# Patient Record
Sex: MALE | Race: WHITE | NOT HISPANIC OR LATINO | Employment: OTHER | URBAN - NONMETROPOLITAN AREA
[De-identification: names, ages, dates, MRNs, and addresses within clinical notes are randomized per-mention and may not be internally consistent; named-entity substitution may affect disease eponyms.]

---

## 2022-01-25 ENCOUNTER — OFFICE VISIT (OUTPATIENT)
Dept: FAMILY MEDICINE CLINIC | Facility: CLINIC | Age: 57
End: 2022-01-25

## 2022-01-25 ENCOUNTER — LAB (OUTPATIENT)
Dept: LAB | Facility: HOSPITAL | Age: 57
End: 2022-01-25

## 2022-01-25 VITALS
HEIGHT: 72 IN | DIASTOLIC BLOOD PRESSURE: 76 MMHG | SYSTOLIC BLOOD PRESSURE: 126 MMHG | BODY MASS INDEX: 30.61 KG/M2 | WEIGHT: 226 LBS

## 2022-01-25 DIAGNOSIS — Z59.89 DOES NOT HAVE HEALTH INSURANCE: Chronic | ICD-10-CM

## 2022-01-25 DIAGNOSIS — E66.09 CLASS 1 OBESITY DUE TO EXCESS CALORIES WITHOUT SERIOUS COMORBIDITY WITH BODY MASS INDEX (BMI) OF 30.0 TO 30.9 IN ADULT: Primary | Chronic | ICD-10-CM

## 2022-01-25 DIAGNOSIS — Z85.850 HISTORY OF THYROID CANCER: Chronic | ICD-10-CM

## 2022-01-25 DIAGNOSIS — E89.0 POSTOPERATIVE HYPOTHYROIDISM: Chronic | ICD-10-CM

## 2022-01-25 DIAGNOSIS — Z87.2 HISTORY OF PSORIASIS: Chronic | ICD-10-CM

## 2022-01-25 DIAGNOSIS — Z87.898 HISTORY OF SYNCOPE: ICD-10-CM

## 2022-01-25 PROCEDURE — 84439 ASSAY OF FREE THYROXINE: CPT | Performed by: FAMILY MEDICINE

## 2022-01-25 PROCEDURE — 99202 OFFICE O/P NEW SF 15 MIN: CPT | Performed by: FAMILY MEDICINE

## 2022-01-25 PROCEDURE — 84443 ASSAY THYROID STIM HORMONE: CPT | Performed by: FAMILY MEDICINE

## 2022-01-25 PROCEDURE — 36415 COLL VENOUS BLD VENIPUNCTURE: CPT | Performed by: FAMILY MEDICINE

## 2022-01-25 RX ORDER — LEVOTHYROXINE SODIUM 0.15 MG/1
150 TABLET ORAL DAILY
COMMUNITY
End: 2022-01-25 | Stop reason: SDUPTHER

## 2022-01-25 RX ORDER — LEVOTHYROXINE SODIUM 0.15 MG/1
150 TABLET ORAL DAILY
Qty: 90 TABLET | Refills: 3 | Status: SHIPPED | OUTPATIENT
Start: 2022-01-25 | End: 2022-01-26

## 2022-01-25 SDOH — ECONOMIC STABILITY - INCOME SECURITY: OTHER PROBLEMS RELATED TO HOUSING AND ECONOMIC CIRCUMSTANCES: Z59.89

## 2022-01-25 NOTE — PROGRESS NOTES
Subjective   Harry Zambrano is a 56 y.o. male.  Requesting primary care evaluation.  [place]  Carries diagnoses of thyroid carcinoma status post total thyroidectomy.  Last T4 and TSH were done in the fall TSH was not below 1 had adjustments in dosing need to repeat.  Chemistries at that time within normal limits.  Takes no other regular medication.  Past surgical history is also remarkable for rotator cuff repair in the past.  Does not smoke nor drink.  Has had vaccinations as outlined.  Has had a colonoscopy within the last 5 to 6 years.  History and sidebar are generated.  [facility] has a loop recorder and will need to be removed soon was put in sometime ago for recurrent syncope felt to be cardiac in nature but no definitive diagnosis    History of Present Illness   HPI    The following portions of the patient's history were reviewed and updated as appropriate: allergies, current medications, past family history, past medical history, past social history, past surgical history and problem list.    Review of Systems  Review of Systems   Constitutional: Negative for activity change, appetite change, fatigue and unexpected weight change.   HENT: Negative for trouble swallowing and voice change.    Eyes: Negative for redness and visual disturbance.   Respiratory: Negative for cough and wheezing.    Cardiovascular: Negative for chest pain and palpitations.   Gastrointestinal: Negative for abdominal pain, constipation, diarrhea, nausea and vomiting.   Genitourinary: Negative for urgency.   Musculoskeletal: Negative for joint swelling.   Skin: Positive for rash (Has a history of localized psoriasis behind the right ear and umbilicus currently not active at present).   Neurological: Negative for syncope and headaches.   Hematological: Negative for adenopathy.   Psychiatric/Behavioral: Negative for sleep disturbance.       Objective   Physical Exam  Physical Exam  Constitutional:        "Appearance: He is well-developed.   HENT:      Head: Normocephalic.   Eyes:      Pupils: Pupils are equal, round, and reactive to light.   Neck:      Thyroid: No thyroid mass, thyromegaly or thyroid tenderness.   Cardiovascular:      Rate and Rhythm: Normal rate and regular rhythm.      Heart sounds: Normal heart sounds. No murmur heard.  No friction rub. No gallop.    Pulmonary:      Breath sounds: Normal breath sounds.   Abdominal:      General: There is no distension.      Palpations: Abdomen is soft. There is no mass.      Tenderness: There is no abdominal tenderness.   Musculoskeletal:         General: Normal range of motion.      Cervical back: Normal range of motion.   Lymphadenopathy:      Cervical: No cervical adenopathy.   Skin:     General: Skin is warm and dry.   Neurological:      Mental Status: He is alert and oriented to person, place, and time.      Deep Tendon Reflexes: Reflexes are normal and symmetric.   Psychiatric:         Attention and Perception: Attention normal.         Mood and Affect: Mood normal.         Speech: Speech normal.         Behavior: Behavior normal.         Thought Content: Thought content normal.         Cognition and Memory: Cognition normal.           Visit Vitals  /76   Ht 182.9 cm (72\")   Wt 103 kg (226 lb)   BMI 30.65 kg/m²     Body mass index is 30.65 kg/m².    /76   Ht 182.9 cm (72\")   Wt 103 kg (226 lb)   BMI 30.65 kg/m²     Assessment/Plan   Diagnoses and all orders for this visit:    1. Class 1 obesity due to excess calories without serious comorbidity with body mass index (BMI) of 30.0 to 30.9 in adult (Primary)    2. History of thyroid cancer  -     T4, Free  -     TSH  -     levothyroxine (SYNTHROID, LEVOTHROID) 150 MCG tablet; Take 1 tablet by mouth Daily.  Dispense: 90 tablet; Refill: 3    3. History of syncope  -     Ambulatory Referral to Cardiology    4. History of psoriasis    5. Postoperative hypothyroidism  -     T4, Free  -     TSH  -     " levothyroxine (SYNTHROID, LEVOTHROID) 150 MCG tablet; Take 1 tablet by mouth Daily.  Dispense: 90 tablet; Refill: 3    6. Does not have health insurance    Repeat free T4 and TSH today.  Adjust medicines accordingly.  Obtain old records.  Follow-up based on results.

## 2022-01-26 DIAGNOSIS — E89.0 POSTOPERATIVE HYPOTHYROIDISM: Primary | Chronic | ICD-10-CM

## 2022-01-26 LAB
T4 FREE SERPL-MCNC: 1.64 NG/DL (ref 0.93–1.7)
TSH SERPL DL<=0.05 MIU/L-ACNC: 7.22 UIU/ML (ref 0.27–4.2)

## 2022-01-26 RX ORDER — LEVOTHYROXINE SODIUM 175 UG/1
175 TABLET ORAL DAILY
Qty: 90 TABLET | Refills: 1 | Status: SHIPPED | OUTPATIENT
Start: 2022-01-26 | End: 2022-05-03 | Stop reason: SDUPTHER

## 2022-01-26 NOTE — PROGRESS NOTES
T4 ok.  Tsh at 7.  ? Been out of meds for any length of time.  If not, then will need to adjust dosing of current meds

## 2022-05-02 ENCOUNTER — LAB (OUTPATIENT)
Dept: LAB | Facility: HOSPITAL | Age: 57
End: 2022-05-02

## 2022-05-02 DIAGNOSIS — E89.0 POSTOPERATIVE HYPOTHYROIDISM: Chronic | ICD-10-CM

## 2022-05-02 LAB
T4 FREE SERPL-MCNC: 2.02 NG/DL (ref 0.93–1.7)
TSH SERPL DL<=0.05 MIU/L-ACNC: 0.18 UIU/ML (ref 0.27–4.2)

## 2022-05-02 PROCEDURE — 84443 ASSAY THYROID STIM HORMONE: CPT

## 2022-05-02 PROCEDURE — 84439 ASSAY OF FREE THYROXINE: CPT

## 2022-05-02 PROCEDURE — 36415 COLL VENOUS BLD VENIPUNCTURE: CPT

## 2022-05-03 ENCOUNTER — TELEPHONE (OUTPATIENT)
Dept: FAMILY MEDICINE CLINIC | Facility: CLINIC | Age: 57
End: 2022-05-03

## 2022-05-03 DIAGNOSIS — E89.0 POSTOPERATIVE HYPOTHYROIDISM: Primary | Chronic | ICD-10-CM

## 2022-05-03 RX ORDER — LEVOTHYROXINE SODIUM 0.15 MG/1
TABLET ORAL
Qty: 90 TABLET | Refills: 1 | Status: SHIPPED | OUTPATIENT
Start: 2022-05-03 | End: 2022-09-09 | Stop reason: SDUPTHER

## 2022-05-03 NOTE — PROGRESS NOTES
Thyroid level too high.  Will step down dose to next lower level and hold there. Be at pharmacy today

## 2022-09-08 ENCOUNTER — OFFICE VISIT (OUTPATIENT)
Dept: FAMILY MEDICINE CLINIC | Facility: CLINIC | Age: 57
End: 2022-09-08

## 2022-09-08 ENCOUNTER — LAB (OUTPATIENT)
Dept: LAB | Facility: HOSPITAL | Age: 57
End: 2022-09-08

## 2022-09-08 VITALS
BODY MASS INDEX: 30.53 KG/M2 | WEIGHT: 225.4 LBS | HEIGHT: 72 IN | DIASTOLIC BLOOD PRESSURE: 80 MMHG | SYSTOLIC BLOOD PRESSURE: 122 MMHG

## 2022-09-08 DIAGNOSIS — Z13.220 SCREENING CHOLESTEROL LEVEL: ICD-10-CM

## 2022-09-08 DIAGNOSIS — E89.0 POSTOPERATIVE HYPOTHYROIDISM: Primary | Chronic | ICD-10-CM

## 2022-09-08 DIAGNOSIS — Z12.5 SCREENING PSA (PROSTATE SPECIFIC ANTIGEN): ICD-10-CM

## 2022-09-08 DIAGNOSIS — Z85.850 HISTORY OF THYROID CANCER: Chronic | ICD-10-CM

## 2022-09-08 DIAGNOSIS — M26.622 TMJ TENDERNESS, LEFT: ICD-10-CM

## 2022-09-08 DIAGNOSIS — E66.09 CLASS 1 OBESITY DUE TO EXCESS CALORIES WITHOUT SERIOUS COMORBIDITY WITH BODY MASS INDEX (BMI) OF 31.0 TO 31.9 IN ADULT: Chronic | ICD-10-CM

## 2022-09-08 DIAGNOSIS — L82.1 SEBORRHEIC KERATOSES: ICD-10-CM

## 2022-09-08 DIAGNOSIS — Z11.59 NEED FOR HEPATITIS C SCREENING TEST: ICD-10-CM

## 2022-09-08 DIAGNOSIS — Z87.898 HISTORY OF SYNCOPE: Chronic | ICD-10-CM

## 2022-09-08 PROBLEM — Z59.89 DOES NOT HAVE HEALTH INSURANCE: Chronic | Status: RESOLVED | Noted: 2022-01-25 | Resolved: 2022-09-08

## 2022-09-08 PROCEDURE — 86803 HEPATITIS C AB TEST: CPT | Performed by: FAMILY MEDICINE

## 2022-09-08 PROCEDURE — 36415 COLL VENOUS BLD VENIPUNCTURE: CPT | Performed by: FAMILY MEDICINE

## 2022-09-08 PROCEDURE — 84443 ASSAY THYROID STIM HORMONE: CPT | Performed by: FAMILY MEDICINE

## 2022-09-08 PROCEDURE — 84439 ASSAY OF FREE THYROXINE: CPT | Performed by: FAMILY MEDICINE

## 2022-09-08 PROCEDURE — 99214 OFFICE O/P EST MOD 30 MIN: CPT | Performed by: FAMILY MEDICINE

## 2022-09-08 PROCEDURE — G0103 PSA SCREENING: HCPCS | Performed by: FAMILY MEDICINE

## 2022-09-08 PROCEDURE — 80061 LIPID PANEL: CPT | Performed by: FAMILY MEDICINE

## 2022-09-08 RX ORDER — OMEPRAZOLE 20 MG/1
20 TABLET, DELAYED RELEASE ORAL
COMMUNITY

## 2022-09-08 NOTE — PROGRESS NOTES
Subjective   Harry Zambrano is a 57 y.o. male.  Reevaluation previous visit.  Now has health insurance.  Did not keep follow-up with cardiology in regards to syncope work-up from elsewhere we referred to electrophysiologist patient request.  Counseled probably to get all his records.  Some already in epic.  It is time for lab work.  Time to check thyroid etc.  In interim has had some seborrheic keratoses of the forehead he is counseled about.  Also had otalgia on the left treated elsewhere for was considered otitis however exam today reveals probably more TMJ.  History of Present Illness   HPI    The following portions of the patient's history were reviewed and updated as appropriate: allergies, current medications, past family history, past medical history, past social history, past surgical history and problem list.    Review of Systems  Review of Systems   Constitutional: Negative for activity change, appetite change, fatigue and unexpected weight change.   HENT: Positive for ear pain (Intermittent left). Negative for trouble swallowing and voice change.    Eyes: Negative for redness and visual disturbance.   Respiratory: Negative for cough and wheezing.    Cardiovascular: Negative for chest pain and palpitations.   Gastrointestinal: Negative for abdominal pain, constipation, diarrhea, nausea and vomiting.   Genitourinary: Negative for urgency.   Musculoskeletal: Negative for joint swelling.   Neurological: Negative for syncope and headaches.   Hematological: Negative for adenopathy.   Psychiatric/Behavioral: Negative for sleep disturbance.       Objective   Physical Exam  Physical Exam  Constitutional:       Appearance: He is well-developed.   HENT:      Head: Normocephalic.      Right Ear: Tympanic membrane, ear canal and external ear normal.      Left Ear: Tympanic membrane, ear canal and external ear normal.      Ears:      Comments: Left TMJ mL nontender full range of motion     Nose: Nose normal.   Eyes:     "  Pupils: Pupils are equal, round, and reactive to light.   Neck:      Thyroid: No thyromegaly.   Cardiovascular:      Rate and Rhythm: Normal rate and regular rhythm.      Heart sounds: Normal heart sounds. No murmur heard.    No friction rub. No gallop.   Pulmonary:      Breath sounds: Normal breath sounds.   Abdominal:      General: There is no distension.      Palpations: Abdomen is soft. There is no mass.      Tenderness: There is no abdominal tenderness.   Musculoskeletal:         General: Normal range of motion.      Cervical back: Normal range of motion.   Skin:     General: Skin is warm and dry.   Neurological:      Mental Status: He is alert and oriented to person, place, and time.      Deep Tendon Reflexes: Reflexes are normal and symmetric.           Visit Vitals  /80   Ht 181.6 cm (71.5\")   Wt 102 kg (225 lb 6.4 oz)   BMI 31.00 kg/m²     Body mass index is 31 kg/m².    /80   Ht 181.6 cm (71.5\")   Wt 102 kg (225 lb 6.4 oz)   BMI 31.00 kg/m²     Assessment/Plan   Diagnoses and all orders for this visit:    1. Postoperative hypothyroidism (Primary)  -     T4, Free  -     TSH    2. Class 1 obesity due to excess calories without serious comorbidity with body mass index (BMI) of 31.0 to 31.9 in adult    3. History of thyroid cancer    4. Screening cholesterol level  -     Lipid Panel With LDL / HDL Ratio    5. Screening PSA (prostate specific antigen)  -     PSA Screen    6. Need for hepatitis C screening test  -     Hepatitis C Antibody    7. History of syncope  -     Ambulatory Referral to Cardiology    8. Seborrheic keratoses    9. TMJ tenderness, left     on wt loss measures and programs  Dental referral for TMJ.  Lab work ordered today.  Referred back to cardiology.  Will get a shingles vaccine on a weekend when he can rest.  Otherwise recheck 6 months  "

## 2022-09-09 DIAGNOSIS — E89.0 POSTOPERATIVE HYPOTHYROIDISM: Chronic | ICD-10-CM

## 2022-09-09 LAB
CHOLEST SERPL-MCNC: 191 MG/DL (ref 0–200)
HCV AB SER DONR QL: NORMAL
HDLC SERPL-MCNC: 47 MG/DL (ref 40–60)
LDLC SERPL CALC-MCNC: 125 MG/DL (ref 0–100)
LDLC/HDLC SERPL: 2.61 {RATIO}
PSA SERPL-MCNC: 0.52 NG/ML (ref 0–4)
T4 FREE SERPL-MCNC: 1.79 NG/DL (ref 0.93–1.7)
TRIGL SERPL-MCNC: 107 MG/DL (ref 0–150)
TSH SERPL DL<=0.05 MIU/L-ACNC: 0.49 UIU/ML (ref 0.27–4.2)
VLDLC SERPL-MCNC: 19 MG/DL (ref 5–40)

## 2022-09-09 RX ORDER — LEVOTHYROXINE SODIUM 0.15 MG/1
TABLET ORAL
Qty: 90 TABLET | Refills: 3 | Status: SHIPPED | OUTPATIENT
Start: 2022-09-09

## 2022-10-25 ENCOUNTER — OFFICE VISIT (OUTPATIENT)
Dept: CARDIOLOGY | Facility: CLINIC | Age: 57
End: 2022-10-25

## 2022-10-25 ENCOUNTER — CLINICAL SUPPORT (OUTPATIENT)
Dept: CARDIOLOGY | Facility: CLINIC | Age: 57
End: 2022-10-25

## 2022-10-25 VITALS
SYSTOLIC BLOOD PRESSURE: 132 MMHG | OXYGEN SATURATION: 97 % | BODY MASS INDEX: 30.61 KG/M2 | DIASTOLIC BLOOD PRESSURE: 80 MMHG | HEART RATE: 76 BPM | WEIGHT: 226 LBS | HEIGHT: 72 IN

## 2022-10-25 DIAGNOSIS — R55 VASOVAGAL SYNCOPE: Primary | ICD-10-CM

## 2022-10-25 DIAGNOSIS — Z87.898 HISTORY OF SYNCOPE: Primary | Chronic | ICD-10-CM

## 2022-10-25 LAB
QT INTERVAL: 386 MS
QTC INTERVAL: 434 MS

## 2022-10-25 PROCEDURE — 99203 OFFICE O/P NEW LOW 30 MIN: CPT | Performed by: INTERNAL MEDICINE

## 2022-10-25 PROCEDURE — 93000 ELECTROCARDIOGRAM COMPLETE: CPT | Performed by: INTERNAL MEDICINE

## 2022-10-25 PROCEDURE — 93291 INTERROG DEV EVAL SCRMS IP: CPT | Performed by: INTERNAL MEDICINE

## 2022-10-25 NOTE — PROGRESS NOTES
Harry Zambrano  57 y.o. male    10/25/2022  1. Vasovagal syncope        History of Present Illness:    Body mass index is 31.08 kg/m². BMI is above normal parameters. Recommendations include: exercise counseling, nutrition counseling and referral to primary care.    57 years old patient who relocated to our facility with clinical diagnosis of what appears to vasovagal syncope have total 2 episode and about 4 years ago when evaluated at [facility] and having extensive evaluations including echo and stress test and also neurological evaluations.  Subsequent intracardiac loop was implanted and diagnosis of vasovagal or neurocardiogenic syncope was made.  The loop was implanted more than 4 years since that there is no further recurrence of syncopal episode or dizziness.  He was being managed with salt and water and AWA stockings.  He want to establish with our office.  He remained physically active fortunately does not drink no symptom of cardiac decompensation such orthopnea PND chest pain or any further syncope episode reported he does have history of postoperative hypothyroidism and history of cirrhosis and previous history of thyroid cancer.  He is a class I obesity with a BMI of 31.08.  EKG in the office sinus rhythm with rare PACs.  Monitor was reviewed from medical record revealed sinus rhythm with PVCs          SUBJECTIVE:    No Known Allergies      History reviewed. No pertinent past medical history.      Past Surgical History:   Procedure Laterality Date   • ROTATOR CUFF REPAIR     • TOTAL THYROIDECTOMY           Family History   Problem Relation Age of Onset   • Diabetes Mother    • Hemochromatosis Maternal Grandfather          Social History     Socioeconomic History   • Marital status:    Tobacco Use   • Smoking status: Never   • Smokeless tobacco: Never   Substance and Sexual Activity   • Alcohol use: Never   • Drug use: Never         Current Outpatient Medications   Medication Sig  "Dispense Refill   • levothyroxine (Synthroid) 150 MCG tablet 1 qd new dose 90 tablet 3   • omeprazole OTC (PriLOSEC OTC) 20 MG EC tablet Take 20 mg by mouth.       No current facility-administered medications for this visit.           Review of Systems:         Constitutional:  Denies recent weight loss, weight gain,no change in exercise tolerance.     HENT:  Denies any hearing loss, epistaxis    Eyes: No blurring    Respiratory: No history of COPD    Cardiovascular: See H&P    Gastrointestinal:  Denies change in bowel habits and dyspepsia    Endocrine: Negative for cold intolerance, heat intolerance, polydipsia    Genitourinary: Negative.      Musculoskeletal: History of osteoarthritis    Skin:  Deniesrashes, or skin lesions.     Allergic/Immunologic: Negative.  Negative for environmental allergies    Neurological: Positive for syncope    Hematological: Denies any food allergies, seasonal allergies    Psychiatric/Behavioral: Denies any history of depression        OBJECTIVE:    /80 (BP Location: Right arm, Patient Position: Sitting, Cuff Size: Adult)   Pulse 76   Ht 181.6 cm (71.5\")   Wt 103 kg (226 lb)   SpO2 97%   BMI 31.08 kg/m²     Physical Exam:     No changes noted in today's physical exam    Constitutional: Cooperative, alert and oriented, well-developed, well-nourished, in no acute distress.     HENT:   Head: Normocephalic, conjunctive is a pink, thyroid is nonpalpable no carotid bruit and trachea central.     Cardiovascular: Regular rhythm, S1 and S2 normal, no S3 or S4. Apical impulse not displaced. No murmurs    Pulmonary/Chest: Chest: No chest wall tenderness no rales and wheezing    Abdominal: Abdomen soft, bowel sounds normoactive, no masses,    Musculoskeletal: No deformities, clubbing, cyanosis, erythema. positive mild edema  Neurological: No gross motor or sensory deficits noted    Skin: Warm and dry to the touch, no apparent skin lesions .     Psychiatric: He has a normal mood and " affect. His behavior is normal        Procedures      Lab Results   Component Value Date    WBC 5.4 11/14/2019    HGB 17.4 11/14/2019    HCT 49.3 11/14/2019    MCV 90 11/14/2019     11/14/2019     Lab Results   Component Value Date    BUN 16 06/12/2019    CREATININE 1.04 06/12/2019    EGFRIFNONA >60 06/12/2019    EGFRIFAFRI >60 06/12/2019    BCR 15.4 06/12/2019    CO2 30 06/12/2019    CALCIUM 9.3 06/12/2019    ALBUMIN 4.2 06/12/2019    AST 25 06/12/2019    ALT 45 06/12/2019     Lab Results   Component Value Date    CHOL 191 09/08/2022     Lab Results   Component Value Date    TRIG 107 09/08/2022     Lab Results   Component Value Date    HDL 47 09/08/2022     No components found for: LDLCALC  Lab Results   Component Value Date     (H) 09/08/2022     No results found for: HDLLDLRATIO  No components found for: CHOLHDL  No results found for: HGBA1C  Lab Results   Component Value Date    TSH 0.487 09/08/2022           ASSESSMENT AND PLAN:  #1 vasovagal syncope    Patient extensive valuation including neuro and cardiac.  Cardiac evaluation was performed at [facility] more than 4 years ago with echo stress test reported normal function with evidence of ischemia and had intracardiac loop placed manufactures ResQU.  This loop was implanted close to 4 years ago and since then per patient no further recurrence of syncope or postural dizziness.    Will continue to monitor and interrogate intracardiac loop.  Patient was counseled and educated regarding risk factor lifestyle modification.  Patient was encouraged to continue to drink water with salt palatable to taste and continue use AWA stockings.    See him in 4-month and we will make arrangements to interrogate the intracardiac loop    I spent 25 minutes caring for Harry on this date of service. This time includes time spent by me of counseling/coordination of care as relates to the presenting problem and any ordered procedures/tests as  outlined above.         This document has been electronically signed by Loren Daley MD on October 25, 2022 09:37 CDT          Diagnoses and all orders for this visit:    1. Vasovagal syncope (Primary)  -     ECG 12 Lead          Loren Daley MD  10/25/2022  09:25 CDT

## 2022-10-25 NOTE — PROGRESS NOTES
Loop Recorder 30 day Event Summary, OFFICE   Patient was evaluated in the office today and once establishes cardiology care.  Had history of vasovagal syncope s/p intracardiac loop implanted.  Indications:   Syncope     Battery status: good        Loop recorder interrogation shows 0 episodes of atrial fibrillation     COUNTER for last 30 days  Symptoms: 0  Tachy: 0  Pause: 0  [place]: 88  AT: 0  AF: 0  % of time in AF: <0.1     Observation Summary:  Noise ( % of day) 0  Loop was interrogated no significant bradycardia noted.  Underlying rhythm is sinus with PVCs

## 2022-10-28 ENCOUNTER — PATIENT ROUNDING (BHMG ONLY) (OUTPATIENT)
Dept: CARDIOLOGY | Facility: CLINIC | Age: 57
End: 2022-10-28

## 2022-10-28 NOTE — PROGRESS NOTES
October 28, 2022    Hello, may I speak with Harry Zambrano?    My name is   SINA Jiang    I am  with [facility]ARDIOLOG[facility]  [address] [place] [address] [phone].    Before we get started may I verify your date of birth? 1965    I am calling to officially welcome you to our practice and ask about your recent visit. Is this a good time to talk? yes    Tell me about your visit with us. What things went well?  everything went fine        We're always looking for ways to make our patients' experiences even better. Do you have recommendations on ways we may improve?  no    Overall were you satisfied with your first visit to our practice? yes       I appreciate you taking the time to speak with me today. Is there anything else I can do for you? no      Thank you, and have a great day.

## 2023-01-04 ENCOUNTER — CLINICAL SUPPORT (OUTPATIENT)
Dept: CARDIOLOGY | Facility: CLINIC | Age: 58
End: 2023-01-04
Payer: COMMERCIAL

## 2023-01-04 DIAGNOSIS — Z87.898 HISTORY OF SYNCOPE: Primary | Chronic | ICD-10-CM

## 2023-01-04 PROCEDURE — 93291 INTERROG DEV EVAL SCRMS IP: CPT | Performed by: INTERNAL MEDICINE

## 2023-01-04 NOTE — PROGRESS NOTES
Loop Recorder 30 day Event Summary, OFFICE   Patient was evaluated in the office today and once establishes cardiology care.  Had history of vasovagal syncope s/p intracardiac loop implanted.  Indications:   Syncope     Battery status: good        Loop recorder interrogation shows 0 episodes of atrial fibrillation     COUNTER for last 30 days  Symptoms: 0  Tachy: 0  Pause: 0  [place]: 4  AT: 0  AF: 0  % of time in AF: 0.0%     Observation Summary:  Noise ( % of day) 0    Diagnosis  Syncope  Loop was interrogated no bradycardia or tachyarrhythmia noted

## 2023-03-08 ENCOUNTER — LAB (OUTPATIENT)
Dept: LAB | Facility: HOSPITAL | Age: 58
End: 2023-03-08
Payer: COMMERCIAL

## 2023-03-08 ENCOUNTER — OFFICE VISIT (OUTPATIENT)
Dept: FAMILY MEDICINE CLINIC | Facility: CLINIC | Age: 58
End: 2023-03-08
Payer: COMMERCIAL

## 2023-03-08 VITALS
DIASTOLIC BLOOD PRESSURE: 80 MMHG | SYSTOLIC BLOOD PRESSURE: 124 MMHG | WEIGHT: 223.5 LBS | HEIGHT: 72 IN | BODY MASS INDEX: 30.27 KG/M2

## 2023-03-08 DIAGNOSIS — Z11.4 SCREENING FOR HIV (HUMAN IMMUNODEFICIENCY VIRUS): ICD-10-CM

## 2023-03-08 DIAGNOSIS — Z11.3 ROUTINE SCREENING FOR STI (SEXUALLY TRANSMITTED INFECTION): ICD-10-CM

## 2023-03-08 DIAGNOSIS — Z12.5 SCREENING PSA (PROSTATE SPECIFIC ANTIGEN): ICD-10-CM

## 2023-03-08 DIAGNOSIS — Z13.220 SCREENING CHOLESTEROL LEVEL: ICD-10-CM

## 2023-03-08 DIAGNOSIS — Z85.850 HISTORY OF THYROID CANCER: ICD-10-CM

## 2023-03-08 DIAGNOSIS — Z53.20 ANTENATAL SCREENING FOR HEPATITIS B VIRUS DECLINED: ICD-10-CM

## 2023-03-08 DIAGNOSIS — E89.0 POSTOPERATIVE HYPOTHYROIDISM: Primary | Chronic | ICD-10-CM

## 2023-03-08 DIAGNOSIS — Z02.1 PHYSICAL EXAM, PRE-EMPLOYMENT: ICD-10-CM

## 2023-03-08 DIAGNOSIS — E66.09 CLASS 1 OBESITY DUE TO EXCESS CALORIES WITHOUT SERIOUS COMORBIDITY WITH BODY MASS INDEX (BMI) OF 30.0 TO 30.9 IN ADULT: Chronic | ICD-10-CM

## 2023-03-08 PROBLEM — Z87.898 HISTORY OF SYNCOPE: Chronic | Status: RESOLVED | Noted: 2022-01-25 | Resolved: 2023-03-08

## 2023-03-08 LAB
ALBUMIN SERPL-MCNC: 4.7 G/DL (ref 3.5–5.2)
ALBUMIN/GLOB SERPL: 1.6 G/DL
ALP SERPL-CCNC: 68 U/L (ref 39–117)
ALT SERPL W P-5'-P-CCNC: 63 U/L (ref 1–41)
ANION GAP SERPL CALCULATED.3IONS-SCNC: 8.4 MMOL/L (ref 5–15)
AST SERPL-CCNC: 34 U/L (ref 1–40)
BACTERIA UR QL AUTO: ABNORMAL /HPF
BILIRUB SERPL-MCNC: 1 MG/DL (ref 0–1.2)
BILIRUB UR QL STRIP: NEGATIVE
BUN SERPL-MCNC: 15 MG/DL (ref 6–20)
BUN/CREAT SERPL: 13.2 (ref 7–25)
CALCIUM SPEC-SCNC: 10.2 MG/DL (ref 8.6–10.5)
CHLORIDE SERPL-SCNC: 101 MMOL/L (ref 98–107)
CLARITY UR: CLEAR
CO2 SERPL-SCNC: 27.6 MMOL/L (ref 22–29)
COLOR UR: ABNORMAL
CREAT SERPL-MCNC: 1.14 MG/DL (ref 0.76–1.27)
DEPRECATED RDW RBC AUTO: 41.2 FL (ref 37–54)
EGFRCR SERPLBLD CKD-EPI 2021: 76 ML/MIN/1.73
ERYTHROCYTE [DISTWIDTH] IN BLOOD BY AUTOMATED COUNT: 12.8 % (ref 12.3–15.4)
GLOBULIN UR ELPH-MCNC: 3 GM/DL
GLUCOSE SERPL-MCNC: 219 MG/DL (ref 65–99)
GLUCOSE UR STRIP-MCNC: NEGATIVE MG/DL
HBV SURFACE AG SERPL QL IA: NORMAL
HCT VFR BLD AUTO: 51.4 % (ref 37.5–51)
HGB BLD-MCNC: 18.2 G/DL (ref 13–17.7)
HGB UR QL STRIP.AUTO: NEGATIVE
HIV1+2 AB SER QL: NORMAL
HYALINE CASTS UR QL AUTO: ABNORMAL /LPF
KETONES UR QL STRIP: ABNORMAL
LEUKOCYTE ESTERASE UR QL STRIP.AUTO: NEGATIVE
MCH RBC QN AUTO: 31.7 PG (ref 26.6–33)
MCHC RBC AUTO-ENTMCNC: 35.4 G/DL (ref 31.5–35.7)
MCV RBC AUTO: 89.5 FL (ref 79–97)
NITRITE UR QL STRIP: NEGATIVE
PH UR STRIP.AUTO: <=5 [PH] (ref 5–9)
PLATELET # BLD AUTO: 191 10*3/MM3 (ref 140–450)
PMV BLD AUTO: 11.4 FL (ref 6–12)
POTASSIUM SERPL-SCNC: 4.2 MMOL/L (ref 3.5–5.2)
PROT SERPL-MCNC: 7.7 G/DL (ref 6–8.5)
PROT UR QL STRIP: ABNORMAL
RBC # BLD AUTO: 5.74 10*6/MM3 (ref 4.14–5.8)
RBC # UR STRIP: ABNORMAL /HPF
REF LAB TEST METHOD: ABNORMAL
RPR SER QL: NORMAL
SODIUM SERPL-SCNC: 137 MMOL/L (ref 136–145)
SP GR UR STRIP: 1.02 (ref 1–1.03)
SQUAMOUS #/AREA URNS HPF: ABNORMAL /HPF
T4 FREE SERPL-MCNC: 2.21 NG/DL (ref 0.93–1.7)
TSH SERPL DL<=0.05 MIU/L-ACNC: 0.41 UIU/ML (ref 0.27–4.2)
UROBILINOGEN UR QL STRIP: ABNORMAL
WBC # UR STRIP: ABNORMAL /HPF
WBC NRBC COR # BLD: 6.14 10*3/MM3 (ref 3.4–10.8)

## 2023-03-08 PROCEDURE — 80053 COMPREHEN METABOLIC PANEL: CPT | Performed by: FAMILY MEDICINE

## 2023-03-08 PROCEDURE — PEPHY: Performed by: FAMILY MEDICINE

## 2023-03-08 PROCEDURE — 99213 OFFICE O/P EST LOW 20 MIN: CPT | Performed by: FAMILY MEDICINE

## 2023-03-08 PROCEDURE — 36415 COLL VENOUS BLD VENIPUNCTURE: CPT | Performed by: FAMILY MEDICINE

## 2023-03-08 PROCEDURE — 86592 SYPHILIS TEST NON-TREP QUAL: CPT | Performed by: FAMILY MEDICINE

## 2023-03-08 PROCEDURE — 86800 THYROGLOBULIN ANTIBODY: CPT | Performed by: FAMILY MEDICINE

## 2023-03-08 PROCEDURE — 85027 COMPLETE CBC AUTOMATED: CPT | Performed by: FAMILY MEDICINE

## 2023-03-08 PROCEDURE — G0432 EIA HIV-1/HIV-2 SCREEN: HCPCS | Performed by: FAMILY MEDICINE

## 2023-03-08 PROCEDURE — 84443 ASSAY THYROID STIM HORMONE: CPT | Performed by: FAMILY MEDICINE

## 2023-03-08 PROCEDURE — 84432 ASSAY OF THYROGLOBULIN: CPT | Performed by: FAMILY MEDICINE

## 2023-03-08 PROCEDURE — 81001 URINALYSIS AUTO W/SCOPE: CPT | Performed by: FAMILY MEDICINE

## 2023-03-08 PROCEDURE — 87340 HEPATITIS B SURFACE AG IA: CPT | Performed by: FAMILY MEDICINE

## 2023-03-08 PROCEDURE — 84439 ASSAY OF FREE THYROXINE: CPT | Performed by: FAMILY MEDICINE

## 2023-03-08 NOTE — PROGRESS NOTES
Subjective   Harry Zambrano is a 57 y.o. male.  Presents today with forms from [facility] [employer] for reemployment.  Rather lengthy form to fill out with a great many lab request EKG.  This form was filled out as best can chart reviewed orders put in.  History of Present Illness   HPI    The following portions of the patient's history were reviewed and updated as appropriate: allergies, current medications, past family history, past medical history, past social history, past surgical history and problem list.    Review of Systems  Review of Systems   Constitutional: Negative for activity change, appetite change, fatigue and unexpected weight change.   HENT: Negative for trouble swallowing and voice change.    Eyes: Negative for redness and visual disturbance.   Respiratory: Negative for cough and wheezing.    Cardiovascular: Negative for chest pain and palpitations.   Gastrointestinal: Negative for abdominal pain, constipation, diarrhea, nausea and vomiting.   Genitourinary: Negative for urgency.   Musculoskeletal: Negative for joint swelling.   Neurological: Negative for syncope and headaches.   Hematological: Negative for adenopathy.   Psychiatric/Behavioral: Negative for sleep disturbance.       Objective   Physical Exam  Physical Exam  Constitutional:       Appearance: He is well-developed.   HENT:      Head: Normocephalic.   Eyes:      Pupils: Pupils are equal, round, and reactive to light.   Neck:      Thyroid: No thyromegaly.   Cardiovascular:      Rate and Rhythm: Normal rate and regular rhythm.      Heart sounds: Normal heart sounds. No murmur heard.    No friction rub. No gallop.   Pulmonary:      Breath sounds: Normal breath sounds.   Abdominal:      General: There is no distension.      Palpations: Abdomen is soft. There is no mass.      Tenderness: There is no abdominal tenderness.   Musculoskeletal:         General: Normal range of motion.      Cervical back: Normal range of motion.   Skin:     General:  "Skin is warm and dry.   Neurological:      Mental Status: He is alert and oriented to person, place, and time.      Deep Tendon Reflexes: Reflexes are normal and symmetric.           Visit Vitals  /80   Ht 181.6 cm (71.5\")   Wt 101 kg (223 lb 8 oz)   BMI 30.74 kg/m²     Body mass index is 30.74 kg/m².  /80   Ht 181.6 cm (71.5\")   Wt 101 kg (223 lb 8 oz)   BMI 30.74 kg/m²       Assessment/Plan   Diagnoses and all orders for this visit:    1. Postoperative hypothyroidism (Primary)  -     T4, Free  -     TSH    2. History of thyroid cancer  -     T4, Free  -     TSH  -     Thyroglobulin With Anti-TG  -     T4, Free; Future  -     TSH; Future  -     Thyroglobulin Antibody and Thyroglobulin, CLARIBEL or LC/MS-MS; Future    3. Screening PSA (prostate specific antigen)  -     PSA Screen; Future    4. Screening cholesterol level  -     Lipid Panel With LDL / HDL Ratio; Future    5. Class 1 obesity due to excess calories without serious comorbidity with body mass index (BMI) of 30.0 to 30.9 in adult    6. Physical exam, pre-employment  -     RPR  -     Hepatitis B surface antigen  -     HIV-1 & HIV-2 Antibodies  -     QuantiFERON TB Gold  -     Urinalysis With Microscopic - Urine, Clean Catch  -     CBC (No Diff)  -     Comprehensive Metabolic Panel    7. Screening for HIV (human immunodeficiency virus)  -     HIV-1 & HIV-2 Antibodies    8.  screening for hepatitis B virus declined  -     Hepatitis B surface antigen    9. Routine screening for STI (sexually transmitted infection)  -     RPR    Forms filled out await all lab work.  Patient is felt fit for employment.  "

## 2023-03-08 NOTE — PROGRESS NOTES
Subjective   Harry Zambrano is a 57 y.o. male.  Reevaluation history of hypothyroidism neuroid carcinoma removal obesity history of psoriasis now quiet.  In the interim is maintain weight and blood pressure maintaining all medication without problem.  It is time for all lab work related to thyroid.  Is up-to-date on all other.  Will think about getting shingles vaccine later in the year    History of Present Illness   HPI    The following portions of the patient's history were reviewed and updated as appropriate: allergies, current medications, past family history, past medical history, past social history, past surgical history and problem list.    Review of Systems  Review of Systems   Constitutional: Negative for activity change, appetite change, fatigue and unexpected weight change.   HENT: Negative for trouble swallowing and voice change.    Eyes: Negative for redness and visual disturbance.   Respiratory: Negative for cough and wheezing.    Cardiovascular: Negative for chest pain and palpitations.   Gastrointestinal: Negative for abdominal pain, constipation, diarrhea, nausea and vomiting.   Genitourinary: Negative for urgency.   Musculoskeletal: Negative for joint swelling.   Neurological: Negative for syncope and headaches.   Hematological: Negative for adenopathy.   Psychiatric/Behavioral: Negative for sleep disturbance.       Objective   Physical Exam  Physical Exam  Constitutional:       Appearance: He is well-developed.   HENT:      Head: Normocephalic.   Eyes:      Pupils: Pupils are equal, round, and reactive to light.   Neck:      Thyroid: No thyromegaly.      Comments: No mass effect  Cardiovascular:      Rate and Rhythm: Normal rate and regular rhythm.      Heart sounds: Normal heart sounds. No murmur heard.    No friction rub. No gallop.   Pulmonary:      Breath sounds: Normal breath sounds.   Abdominal:      General: There is no distension.      Palpations: Abdomen is soft. There is no mass.       "Tenderness: There is no abdominal tenderness.   Musculoskeletal:         General: Normal range of motion.      Cervical back: Normal range of motion.   Skin:     General: Skin is warm and dry.   Neurological:      Mental Status: He is alert and oriented to person, place, and time.      Deep Tendon Reflexes: Reflexes are normal and symmetric.   Psychiatric:         Attention and Perception: Attention normal.         Mood and Affect: Mood normal.         Speech: Speech normal.         Behavior: Behavior normal.         Thought Content: Thought content normal.         Cognition and Memory: Cognition normal.           Visit Vitals  /80   Ht 181.6 cm (71.5\")   Wt 101 kg (223 lb 8 oz)   BMI 30.74 kg/m²     Body mass index is 30.74 kg/m².    /80   Ht 181.6 cm (71.5\")   Wt 101 kg (223 lb 8 oz)   BMI 30.74 kg/m²   Assessment/Plan   Diagnoses and all orders for this visit:    1. Postoperative hypothyroidism (Primary)  -     T4, Free  -     TSH    2. History of thyroid cancer  -     T4, Free  -     TSH  -     Thyroglobulin With Anti-TG  -     T4, Free; Future  -     TSH; Future  -     Thyroglobulin Antibody and Thyroglobulin, CLARIBEL or LC/MS-MS; Future    3. Screening PSA (prostate specific antigen)  -     PSA Screen; Future    4. Screening cholesterol level  -     Lipid Panel With LDL / HDL Ratio; Future    5. Class 1 obesity due to excess calories without serious comorbidity with body mass index (BMI) of 30.0 to 30.9 in adult    6. Physical exam, pre-employment  -     RPR  -     Hepatitis B surface antigen  -     HIV-1 & HIV-2 Antibodies  -     QuantiFERON TB Gold  -     Urinalysis With Microscopic - Urine, Clean Catch  -     CBC (No Diff)  -     Comprehensive Metabolic Panel    7. Screening for HIV (human immunodeficiency virus)  -     HIV-1 & HIV-2 Antibodies    8.  screening for hepatitis B virus declined  -     Hepatitis B surface antigen    9. Routine screening for STI (sexually transmitted " infection)  -     RPR    Counseled on lifestyle measures counseled on diet and exercise.  Recheck again in 6 months lab prior lab today as well

## 2023-03-09 LAB
THYROGLOB AB SERPL-ACNC: <1 IU/ML (ref 0–0.9)
THYROGLOB SERPL-MCNC: <0.1 NG/ML (ref 1.4–29.2)

## 2023-03-09 NOTE — PROGRESS NOTES
Lab work done reveals thyroid level is too high again.  Also nonfasting blood sugar is 219.  This very well may reflect new onset type 2 diabetes.  We will need to do an A1c in the office and readjust thyroid.  See us sometime within the next week or so to do same.  Also lab work for preemployment is ready we will print off at that visit.

## 2023-03-15 ENCOUNTER — OFFICE VISIT (OUTPATIENT)
Dept: FAMILY MEDICINE CLINIC | Facility: CLINIC | Age: 58
End: 2023-03-15
Payer: COMMERCIAL

## 2023-03-15 VITALS
WEIGHT: 223 LBS | BODY MASS INDEX: 30.2 KG/M2 | HEIGHT: 72 IN | SYSTOLIC BLOOD PRESSURE: 122 MMHG | DIASTOLIC BLOOD PRESSURE: 78 MMHG

## 2023-03-15 DIAGNOSIS — E11.9 TYPE 2 DIABETES MELLITUS WITHOUT COMPLICATION, WITHOUT LONG-TERM CURRENT USE OF INSULIN: Primary | ICD-10-CM

## 2023-03-15 DIAGNOSIS — E11.65 TYPE 2 DIABETES MELLITUS WITH HYPERGLYCEMIA, WITHOUT LONG-TERM CURRENT USE OF INSULIN: ICD-10-CM

## 2023-03-15 PROCEDURE — 99213 OFFICE O/P EST LOW 20 MIN: CPT | Performed by: FAMILY MEDICINE

## 2023-03-15 RX ORDER — BLOOD-GLUCOSE METER
KIT MISCELLANEOUS
Qty: 1 EACH | Refills: 5 | Status: SHIPPED | OUTPATIENT
Start: 2023-03-15

## 2023-03-15 RX ORDER — METFORMIN HYDROCHLORIDE 500 MG/1
500 TABLET, EXTENDED RELEASE ORAL
Qty: 90 TABLET | Refills: 3 | Status: SHIPPED | OUTPATIENT
Start: 2023-03-15

## 2023-03-15 RX ORDER — LANCETS 28 GAUGE
EACH MISCELLANEOUS
Qty: 100 EACH | Refills: 3 | Status: SHIPPED | OUTPATIENT
Start: 2023-03-15

## 2023-03-15 NOTE — PROGRESS NOTES
Subjective   Harry Zambrano is a 57 y.o. male.  Asked to return.  Nonfasting sugar on last lab work 219.  By all definitions early type 2 diabetes.  No symptomatology however.  Spent some time today counseling on pathophysiology and treatment of same.  Rest of lab work acceptable.  Form for previous preemployment filled out.    History of Present Illness   HPI    The following portions of the patient's history were reviewed and updated as appropriate: allergies, current medications, past family history, past medical history, past social history, past surgical history and problem list.    Review of Systems  Review of Systems   Constitutional: Negative for activity change, appetite change, fatigue and unexpected weight change.   HENT: Negative for trouble swallowing and voice change.    Eyes: Negative for redness and visual disturbance.   Respiratory: Negative for cough and wheezing.    Cardiovascular: Negative for chest pain and palpitations.   Gastrointestinal: Negative for abdominal pain, constipation, diarrhea, nausea and vomiting.   Genitourinary: Negative for urgency.   Musculoskeletal: Negative for joint swelling.   Neurological: Negative for syncope and headaches.   Hematological: Negative for adenopathy.   Psychiatric/Behavioral: Negative for sleep disturbance.       Objective   Physical Exam  Physical Exam  Constitutional:       Appearance: He is well-developed.   HENT:      Head: Normocephalic.   Eyes:      Pupils: Pupils are equal, round, and reactive to light.   Neck:      Thyroid: No thyromegaly.   Cardiovascular:      Rate and Rhythm: Normal rate.      Heart sounds: Normal heart sounds. No murmur heard.    No friction rub. No gallop.   Pulmonary:      Breath sounds: Normal breath sounds.   Abdominal:      General: There is no distension.      Palpations: Abdomen is soft. There is no mass.      Tenderness: There is no abdominal tenderness.   Musculoskeletal:         General: Normal range of motion.       Cervical back: Normal range of motion.   Skin:     General: Skin is warm and dry.   Neurological:      Mental Status: He is alert and oriented to person, place, and time.      Deep Tendon Reflexes: Reflexes are normal and symmetric.   Psychiatric:         Mood and Affect: Mood normal.         Behavior: Behavior normal.         Thought Content: Thought content normal.         Judgment: Judgment normal.       Results for orders placed or performed in visit on 03/08/23   T4, Free    Specimen: Blood   Result Value Ref Range    Free T4 2.21 (H) 0.93 - 1.70 ng/dL   TSH    Specimen: Blood   Result Value Ref Range    TSH 0.408 0.270 - 4.200 uIU/mL   Thyroglobulin With Anti-TG    Specimen: Blood   Result Value Ref Range    Thyroglobulin Ab <1.0 0.0 - 0.9 IU/mL   RPR    Specimen: Blood   Result Value Ref Range    RPR Non-Reactive Non-Reactive   Hepatitis B surface antigen    Specimen: Blood   Result Value Ref Range    Hepatitis B Surface Ag Non-Reactive Non-Reactive   CBC (No Diff)    Specimen: Blood   Result Value Ref Range    WBC 6.14 3.40 - 10.80 10*3/mm3    RBC 5.74 4.14 - 5.80 10*6/mm3    Hemoglobin 18.2 (H) 13.0 - 17.7 g/dL    Hematocrit 51.4 (H) 37.5 - 51.0 %    MCV 89.5 79.0 - 97.0 fL    MCH 31.7 26.6 - 33.0 pg    MCHC 35.4 31.5 - 35.7 g/dL    RDW 12.8 12.3 - 15.4 %    RDW-SD 41.2 37.0 - 54.0 fl    MPV 11.4 6.0 - 12.0 fL    Platelets 191 140 - 450 10*3/mm3   Comprehensive Metabolic Panel    Specimen: Blood   Result Value Ref Range    Glucose 219 (H) 65 - 99 mg/dL    BUN 15 6 - 20 mg/dL    Creatinine 1.14 0.76 - 1.27 mg/dL    Sodium 137 136 - 145 mmol/L    Potassium 4.2 3.5 - 5.2 mmol/L    Chloride 101 98 - 107 mmol/L    CO2 27.6 22.0 - 29.0 mmol/L    Calcium 10.2 8.6 - 10.5 mg/dL    Total Protein 7.7 6.0 - 8.5 g/dL    Albumin 4.7 3.5 - 5.2 g/dL    ALT (SGPT) 63 (H) 1 - 41 U/L    AST (SGOT) 34 1 - 40 U/L    Alkaline Phosphatase 68 39 - 117 U/L    Total Bilirubin 1.0 0.0 - 1.2 mg/dL    Globulin 3.0 gm/dL    A/G Ratio  "1.6 g/dL    BUN/Creatinine Ratio 13.2 7.0 - 25.0    Anion Gap 8.4 5.0 - 15.0 mmol/L    eGFR 76.0 >60.0 mL/min/1.73   HIV-1 / O / 2 Ag / Antibody 4th Generation    Specimen: Blood   Result Value Ref Range    HIV-1/ HIV-2 Non-Reactive Non-Reactive   Urinalysis without microscopic (no culture) - Urine, Clean Catch    Specimen: Urine, Clean Catch   Result Value Ref Range    Color, UA Dark Yellow Yellow, Straw, Dark Yellow, Khadijah    Appearance, UA Clear Clear    pH, UA <=5.0 5.0 - 9.0    Specific Gravity, UA 1.023 1.003 - 1.030    Glucose, UA Negative Negative    Ketones, UA Trace (A) Negative    Bilirubin, UA Negative Negative    Blood, UA Negative Negative    Protein, UA Trace (A) Negative    Leuk Esterase, UA Negative Negative    Nitrite, UA Negative Negative    Urobilinogen, UA 0.2 E.U./dL 0.2 - 1.0 E.U./dL   Urinalysis, Microscopic Only - Urine, Clean Catch    Specimen: Urine, Clean Catch   Result Value Ref Range    RBC, UA 0-2 (A) None Seen /HPF    WBC, UA 0-2 None Seen, 0-2, 3-5 /HPF    Bacteria, UA None Seen None Seen /HPF    Squamous Epithelial Cells, UA 0-2 None Seen, 0-2 /HPF    Hyaline Casts, UA None Seen None Seen /LPF    Methodology Automated Microscopy    Thyroglobulin By CLARIBEL    Specimen: Blood   Result Value Ref Range    THYROGLOBULIN BY CLARIBEL <0.1 (L) 1.4 - 29.2 ng/mL           Visit Vitals  /78   Ht 181.6 cm (71.5\")   Wt 101 kg (223 lb)   BMI 30.67 kg/m²     Body mass index is 30.67 kg/m².  /78   Ht 181.6 cm (71.5\")   Wt 101 kg (223 lb)   BMI 30.67 kg/m²     Assessment/Plan   Diagnoses and all orders for this visit:    1. Type 2 diabetes mellitus without complication, without long-term current use of insulin (HCC) (Primary)  -     metFORMIN ER (GLUCOPHAGE-XR) 500 MG 24 hr tablet; Take 1 tablet by mouth Daily With Breakfast. For sugar  Dispense: 90 tablet; Refill: 3  -     glucose blood test strip; Use daily and as needed  Dispense: 100 each; Refill: 3  -     glucose monitor monitoring kit; " Use daily and as needed  Dispense: 1 each; Refill: 5  -     Lancets (freestyle) lancets; Use daily and as needed  Dispense: 100 each; Refill: 3  -     Ambulatory Referral to Nutrition Services    2. Type 2 diabetes mellitus with hyperglycemia, without long-term current use of insulin (HCC)    Counseled on the above start low-dose metformin consultation with nutrition.  Lifestyle measures discussed recheck 8 weeks A1c here

## 2023-03-23 ENCOUNTER — HOSPITAL ENCOUNTER (OUTPATIENT)
Dept: NUTRITION | Facility: HOSPITAL | Age: 58
Discharge: HOME | End: 2023-03-23
Admitting: DIETITIAN, REGISTERED
Payer: COMMERCIAL

## 2023-03-23 PROCEDURE — 97802 MEDICAL NUTRITION INDIV IN: CPT | Performed by: DIETITIAN, REGISTERED

## 2023-03-23 NOTE — PROGRESS NOTES
Adult Outpatient Nutrition  Assessment/PES    Patient Name:  Harry Zambrano  YOB: 1965  MRN: [id number]    Assessment Date:  3/23/2023    Comments:  Pt was referred by Dr. Andres Alejandra for diabetes education- new diagnosis. Pt has a history of diabetes in many family members. He was surprised at the elevated blood sugar on his last MD visit. Pt is motivated to make needed changes to control his blood sugar. He will have a hemoglobin A1c check on his next visit with Dr. Alejandra.  Encouraged pt to continue to eat three meals per day, drink beverages without added sugar, and limit fried foods. We discussed carbohydrate counting-60 grams per meal and pt shows a good understanding in how to plan his meals. Pt will return for follow up in 1 month. This RDN will follow then.     General Info     Row Name 03/23/23 1446       Today's Session    Person(s) attending today's session Patient       General Information    People in Home spouse               Physical Findings     Row Name 03/23/23 1447          Physical Findings    Overall Physical Appearance overweight                Retired Nutritional Info/Activity     Row Name 03/23/23 1447          Eating Environment    Eating environment Family        Physical Activity    Are you currently involved in an activity/exercise program?  No     Reasons for Inactivity Other (comment)  busy active life being self employed                Home Nutrition Report     Row Name 03/23/23 1448          Home Nutrition Report    Typical Intake (Food/Fluid/EN/PN) pt eats three meals a day and drinks some water , diet pepsi and sweet tea when eating in a restaurant.                Estimated/Assessed Needs - Anthropometrics     Row Name 03/23/23 1447          Estimated/Assessed Needs    Additional Documentation Estimated Calorie Needs (Group)        Estimated Calorie Needs    Estimated Calorie Requirement (kcal/day) 1800  for weight mgt     Estimated Calorie Need Method  [place]                Labs/Tests/Procedures/Meds     Row Name 03/23/23 1450          Labs/Procedures/Meds    Lab Results Reviewed reviewed, pertinent     Lab Results Comments non fasting glucose 219        Medications    Pertinent Medications Reviewed reviewed, pertinent                   Problem/Interventions:   Problem 1     Row Name 03/23/23 1451          Nutrition Diagnoses Problem 1    Problem 1 Knowledge Deficit     Etiology (related to) Medical Diagnosis     Endocrine DM2     Signs/Symptoms (evidenced by) Demonstrated Information Deficit                        Intervention Goal     Row Name 03/23/23 1451          Intervention Goal    General Provide information regarding MNT for treatment/condition     Weight Appropriate weight loss                    Education/Evaluation     Row Name 03/23/23 1451          Education    Education Provided education regarding;Education topics     Education Topics Basic nutrition;CHO counting;Diabetes;Gradual weight loss        Monitor/Evaluation    Monitor Per protocol     Education Follow-up Reinforce PRN                 Electronically signed by:  Parisa Elkins RD  03/23/23 14:53 CDT

## 2023-04-07 PROCEDURE — G2066 INTER DEVC REMOTE 30D: HCPCS | Performed by: INTERNAL MEDICINE

## 2023-04-07 PROCEDURE — 93298 REM INTERROG DEV EVAL SCRMS: CPT | Performed by: INTERNAL MEDICINE

## 2023-04-20 ENCOUNTER — HOSPITAL ENCOUNTER (OUTPATIENT)
Dept: NUTRITION | Facility: HOSPITAL | Age: 58
Discharge: HOME | End: 2023-04-20
Payer: COMMERCIAL

## 2023-04-20 PROCEDURE — 97803 MED NUTRITION INDIV SUBSEQ: CPT | Performed by: DIETITIAN, REGISTERED

## 2023-04-20 NOTE — PROGRESS NOTES
Adult Outpatient Nutrition  Assessment/PES    Patient Name:  Harry Zambrano  YOB: 1965  MRN: [id number]    Assessment Date:  4/20/2023    Comments:  Pt returned for follow up visit after 4 weeks from the initial visit. He said his weight is down to 210 from 223lb. He has needed new clothes since his present ones don't fit.  He is feeling good/not necessarily felt poorly prior to visit with RDN-but he is emotionally feeling better. He has had no low blood sugar symptoms and he is not checking his blood sugar at home. He is anxious to hear of his A1c result when he sees DR. Alejandra in May. He could relay to me how he is following the 60 grams of carbohydrate per meal plan with two 15 gram carbohydrates for snacks. He called me during the month and asked about eating cookies. While I cautioned him re eating them daily, cookies can fit into a healthy diet.  Pt said counting carbs is easy/simple and he is proud of himself . His wife is surprised how well he is doing also. He wants to see Dr. Alejandra with the results of his A1c and follow up with this ,if needed, in the future. I asked he contact me re the results. This RDN will follow as needed.     General Info     Row Name 04/20/23 1128       Today's Session    Person(s) attending today's session Patient       General Information    People in Home spouse                 Retired Nutritional Info/Activity     Row Name 04/20/23 1128          Eating Environment    Eating environment Family        Physical Activity    Are you currently involved in an activity/exercise program?  Yes     Describe physical activity some walking and lots of daily activity walking. purchased a watch to track his steps.                Home Nutrition Report     Row Name 04/20/23 1128          Home Nutrition Report    Typical Intake (Food/Fluid/EN/PN) 3 meals plus 2 snacks. pt can explain to me how he is counting carbs. was confused re the low sugar cereal guideline-6 grams per  serving of SUGAR. he used this with all foods.     Food Preferences more vegetables, less fried and breaded foods, lower sugar cereal, more grilled foods,                  Labs/Tests/Procedures/Meds     Row Name 04/20/23 1130          Labs/Procedures/Meds    Lab Results Comments no new labs                   Problem/Interventions:   Problem 1     Row Name 04/20/23 1131          Nutrition Diagnoses Problem 1    Problem 1 Knowledge Deficit     Etiology (related to) Medical Diagnosis     Endocrine DM2     Signs/Symptoms (evidenced by) Demonstrated Information Deficit                        Intervention Goal     Row Name 04/20/23 1131          Intervention Goal    General Provide information regarding MNT for treatment/condition     Weight Appropriate weight loss                    Education/Evaluation     Row Name 04/20/23 1131          Education    Education Provided education regarding;Education topics     Education Topics Basic nutrition;CHO counting;Diabetes;Gradual weight loss        Monitor/Evaluation    Education Follow-up Reinforce PRN                 Electronically signed by:  Parisa Elkins RD  04/20/23 11:32 CDT

## 2023-05-02 ENCOUNTER — PREP FOR SURGERY (OUTPATIENT)
Dept: OTHER | Facility: HOSPITAL | Age: 58
End: 2023-05-02
Payer: COMMERCIAL

## 2023-05-02 ENCOUNTER — TELEPHONE (OUTPATIENT)
Dept: CARDIOLOGY | Facility: CLINIC | Age: 58
End: 2023-05-02
Payer: COMMERCIAL

## 2023-05-02 ENCOUNTER — OFFICE VISIT (OUTPATIENT)
Dept: CARDIOLOGY | Facility: CLINIC | Age: 58
End: 2023-05-02
Payer: COMMERCIAL

## 2023-05-02 VITALS
OXYGEN SATURATION: 94 % | DIASTOLIC BLOOD PRESSURE: 78 MMHG | SYSTOLIC BLOOD PRESSURE: 128 MMHG | BODY MASS INDEX: 28.99 KG/M2 | HEIGHT: 72 IN | HEART RATE: 82 BPM | WEIGHT: 214 LBS

## 2023-05-02 DIAGNOSIS — I49.5 SICK SINUS SYNDROME: Primary | ICD-10-CM

## 2023-05-02 DIAGNOSIS — Z87.898 HISTORY OF SYNCOPE: Chronic | ICD-10-CM

## 2023-05-02 RX ORDER — BUPIVACAINE HCL/0.9 % NACL/PF 0.1 %
2 PLASTIC BAG, INJECTION (ML) EPIDURAL ONCE
Status: CANCELLED | OUTPATIENT
Start: 2023-05-04 | End: 2023-05-02

## 2023-05-02 RX ORDER — SODIUM CHLORIDE 9 MG/ML
40 INJECTION, SOLUTION INTRAVENOUS AS NEEDED
Status: CANCELLED | OUTPATIENT
Start: 2023-05-04

## 2023-05-02 RX ORDER — SODIUM CHLORIDE 0.9 % (FLUSH) 0.9 %
10 SYRINGE (ML) INJECTION AS NEEDED
Status: CANCELLED | OUTPATIENT
Start: 2023-05-04

## 2023-05-02 RX ORDER — SODIUM CHLORIDE 0.9 % (FLUSH) 0.9 %
3 SYRINGE (ML) INJECTION EVERY 12 HOURS SCHEDULED
Status: CANCELLED | OUTPATIENT
Start: 2023-05-04

## 2023-05-02 NOTE — TELEPHONE ENCOUNTER
Telephoned patient regarding elkin/pauses noted on loop recorder remotes dated 4/6/23. Patient states he remembers feeling sluggish/lightheaded that day. Told patient he needs to be seen today at 1:00 pm in office and if he feels the same symptoms today that he had on 4/6/23 to go to ED. Patient voiced understanding.

## 2023-05-02 NOTE — PROGRESS NOTES
Harry Zambrano  57 y.o. male    5/2/2023     1. Sick sinus syndrome    2. History of syncope        History of Present Illness:    Body mass index is 29.43 kg/m². BMI is above normal parameters. Recommendations include: exercise counseling, nutrition counseling and referral to primary care.  57 years old patient previously evaluated in October 2022 with documented history of syncope with undefined mechanism had intracardiac loop implanted [facility] manufactures [employer]..  Transtelephonic information received earlier today.  Event happened in April 8, 2023 patient multiple pauses longest is 6-second with the patient was contacted he had symptom of near syncope fatigue and tired.  He is also recently diagnosed with mild diabetes.  Previous monitor revealed sinus rhythm with the PVCs.  History of thyroid cancer s/p surgery and he history of cirrhosis.  No orthopnea no PND no chest pain or lightheadedness dizziness or intermittent cardiac issue reported.  He was contacted earlier today he presented to discuss the management of symptomatic multiple pauses as described above with element of sick sinus syndrome.          SUBJECTIVE:    No Known Allergies      No past medical history on file.      Past Surgical History:   Procedure Laterality Date   • ROTATOR CUFF REPAIR     • TOTAL THYROIDECTOMY           Family History   Problem Relation Age of Onset   • Diabetes Mother    • Hemochromatosis Maternal Grandfather          Social History     Socioeconomic History   • Marital status:    Tobacco Use   • Smoking status: Never   • Smokeless tobacco: Never   Substance and Sexual Activity   • Alcohol use: Never   • Drug use: Never         Current Outpatient Medications   Medication Sig Dispense Refill   • glucose blood test strip Use daily and as needed 100 each 3   • glucose monitor monitoring kit Use daily and as needed 1 each 5   • Lancets (freestyle) lancets Use daily and as needed 100 each 3   •  "levothyroxine (Synthroid) 150 MCG tablet 1 qd new dose 90 tablet 3   • metFORMIN ER (GLUCOPHAGE-XR) 500 MG 24 hr tablet Take 1 tablet by mouth Daily With Breakfast. For sugar 90 tablet 3   • omeprazole OTC (PriLOSEC OTC) 20 MG EC tablet Take 1 tablet by mouth.       No current facility-administered medications for this visit.           Review of Systems:         Constitutional:  Denies recent weight loss, weight gain,no change in exercise tolerance.     HENT:  Denies any hearing loss, epistaxis    Eyes: No blurring    Respiratory: No history of COPD    Cardiovascular: See H&P    Gastrointestinal:  Denies change in bowel habits and dyspepsia    Endocrine: Negative for cold intolerance, heat intolerance, polydipsia    Genitourinary: Negative.      Musculoskeletal: History of osteoarthritis    Skin:  Deniesrashes, or skin lesions.     Allergic/Immunologic: Negative.  Negative for environmental allergies    Neurological: Positive for syncope    Hematological: Denies any food allergies, seasonal allergies    Psychiatric/Behavioral: Denies any history of depression        OBJECTIVE:    /78 (BP Location: Left arm, Patient Position: Sitting, Cuff Size: Adult)   Pulse 82   Ht 181.6 cm (71.5\")   Wt 97.1 kg (214 lb)   SpO2 94%   BMI 29.43 kg/m²     Physical Exam:     No changes noted in today's physical exam    Constitutional: Cooperative, alert and oriented, well-developed, well-nourished, in no acute distress.     HENT:   Head: Normocephalic, conjunctive is a pink, thyroid is nonpalpable no carotid bruit and trachea central.     Cardiovascular: Regular rhythm, S1 and S2 normal, no S3 or S4. Apical impulse not displaced. No murmurs    Pulmonary/Chest: Chest: No chest wall tenderness no rales and wheezing    Abdominal: Abdomen soft, bowel sounds normoactive, no masses,    Musculoskeletal: No deformities, clubbing, cyanosis, erythema. positive mild edema  Neurological: No gross motor or sensory deficits " noted    Skin: Warm and dry to the touch, no apparent skin lesions .     Psychiatric: He has a normal mood and affect. His behavior is normal        Procedures      Lab Results   Component Value Date    WBC 6.14 03/08/2023    HGB 18.2 (H) 03/08/2023    HCT 51.4 (H) 03/08/2023    MCV 89.5 03/08/2023     03/08/2023     Lab Results   Component Value Date    GLUCOSE 219 (H) 03/08/2023    BUN 15 03/08/2023    CREATININE 1.14 03/08/2023    EGFRIFNONA >60 06/12/2019    EGFRIFAFRI >60 06/12/2019    BCR 13.2 03/08/2023    CO2 27.6 03/08/2023    CALCIUM 10.2 03/08/2023    ALBUMIN 4.7 03/08/2023    AST 34 03/08/2023    ALT 63 (H) 03/08/2023     Lab Results   Component Value Date    CHOL 191 09/08/2022     Lab Results   Component Value Date    TRIG 107 09/08/2022     Lab Results   Component Value Date    HDL 47 09/08/2022     No components found for: LDLCALC  Lab Results   Component Value Date     (H) 09/08/2022     No results found for: HDLLDLRATIO  No components found for: CHOLHDL  No results found for: HGBA1C  Lab Results   Component Value Date    TSH 0.408 03/08/2023           ASSESSMENT AND PLAN:  #1 vasovagal syncope    #2  Sick sinus syndrome with multiple pauses on intracardiac loop    As described above patient with multiple pauses longest 6 seconds with associated symptom of near syncope.  He presented to discuss the further evaluations management.  Given the symptomatic multiple pauses on the intracardiac loop pacemaker was recommended.  Procedure risk pros and cons discussed with the patient and the family.  Understand willing to proceed forward.  Patient have previous normal stress test and echocardiogram at the [facility].    Risk included but not limited to infection, bleeding, stroke, pneumothorax, hematoma, cardiac perforation, lead dislodgment.  Risk range less than 1 to 7%.  Understand willing to proceed forward.  He is a Mallampati score 2 and ASA class II    Will explant intracardiac  loop at the same time of procedure risk regarding loop explantation also discussed with the patient.        I spent 25 minutes caring for Harry on this date of service. This time includes time spent by me of counseling/coordination of care as relates to the presenting problem and any ordered procedures/tests as outlined above.         This document has been electronically signed by Loren Daley MD on May 2, 2023 13:46 CDT          Diagnoses and all orders for this visit:    1. Sick sinus syndrome (Primary)    2. History of syncope          Loren Daley MD  5/2/2023  13:46 CDT

## 2023-05-02 NOTE — H&P (VIEW-ONLY)
Harry Zambrano  57 y.o. male    5/2/2023     1. Sick sinus syndrome    2. History of syncope        History of Present Illness:    Body mass index is 29.43 kg/m². BMI is above normal parameters. Recommendations include: exercise counseling, nutrition counseling and referral to primary care.  57 years old patient previously evaluated in October 2022 with documented history of syncope with undefined mechanism had intracardiac loop implanted [facility] manufactures [employer]..  Transtelephonic information received earlier today.  Event happened in April 8, 2023 patient multiple pauses longest is 6-second with the patient was contacted he had symptom of near syncope fatigue and tired.  He is also recently diagnosed with mild diabetes.  Previous monitor revealed sinus rhythm with the PVCs.  History of thyroid cancer s/p surgery and he history of cirrhosis.  No orthopnea no PND no chest pain or lightheadedness dizziness or intermittent cardiac issue reported.  He was contacted earlier today he presented to discuss the management of symptomatic multiple pauses as described above with element of sick sinus syndrome.          SUBJECTIVE:    No Known Allergies      No past medical history on file.      Past Surgical History:   Procedure Laterality Date   • ROTATOR CUFF REPAIR     • TOTAL THYROIDECTOMY           Family History   Problem Relation Age of Onset   • Diabetes Mother    • Hemochromatosis Maternal Grandfather          Social History     Socioeconomic History   • Marital status:    Tobacco Use   • Smoking status: Never   • Smokeless tobacco: Never   Substance and Sexual Activity   • Alcohol use: Never   • Drug use: Never         Current Outpatient Medications   Medication Sig Dispense Refill   • glucose blood test strip Use daily and as needed 100 each 3   • glucose monitor monitoring kit Use daily and as needed 1 each 5   • Lancets (freestyle) lancets Use daily and as needed 100 each 3   •  "levothyroxine (Synthroid) 150 MCG tablet 1 qd new dose 90 tablet 3   • metFORMIN ER (GLUCOPHAGE-XR) 500 MG 24 hr tablet Take 1 tablet by mouth Daily With Breakfast. For sugar 90 tablet 3   • omeprazole OTC (PriLOSEC OTC) 20 MG EC tablet Take 1 tablet by mouth.       No current facility-administered medications for this visit.           Review of Systems:         Constitutional:  Denies recent weight loss, weight gain,no change in exercise tolerance.     HENT:  Denies any hearing loss, epistaxis    Eyes: No blurring    Respiratory: No history of COPD    Cardiovascular: See H&P    Gastrointestinal:  Denies change in bowel habits and dyspepsia    Endocrine: Negative for cold intolerance, heat intolerance, polydipsia    Genitourinary: Negative.      Musculoskeletal: History of osteoarthritis    Skin:  Deniesrashes, or skin lesions.     Allergic/Immunologic: Negative.  Negative for environmental allergies    Neurological: Positive for syncope    Hematological: Denies any food allergies, seasonal allergies    Psychiatric/Behavioral: Denies any history of depression        OBJECTIVE:    /78 (BP Location: Left arm, Patient Position: Sitting, Cuff Size: Adult)   Pulse 82   Ht 181.6 cm (71.5\")   Wt 97.1 kg (214 lb)   SpO2 94%   BMI 29.43 kg/m²     Physical Exam:     No changes noted in today's physical exam    Constitutional: Cooperative, alert and oriented, well-developed, well-nourished, in no acute distress.     HENT:   Head: Normocephalic, conjunctive is a pink, thyroid is nonpalpable no carotid bruit and trachea central.     Cardiovascular: Regular rhythm, S1 and S2 normal, no S3 or S4. Apical impulse not displaced. No murmurs    Pulmonary/Chest: Chest: No chest wall tenderness no rales and wheezing    Abdominal: Abdomen soft, bowel sounds normoactive, no masses,    Musculoskeletal: No deformities, clubbing, cyanosis, erythema. positive mild edema  Neurological: No gross motor or sensory deficits " noted    Skin: Warm and dry to the touch, no apparent skin lesions .     Psychiatric: He has a normal mood and affect. His behavior is normal        Procedures      Lab Results   Component Value Date    WBC 6.14 03/08/2023    HGB 18.2 (H) 03/08/2023    HCT 51.4 (H) 03/08/2023    MCV 89.5 03/08/2023     03/08/2023     Lab Results   Component Value Date    GLUCOSE 219 (H) 03/08/2023    BUN 15 03/08/2023    CREATININE 1.14 03/08/2023    EGFRIFNONA >60 06/12/2019    EGFRIFAFRI >60 06/12/2019    BCR 13.2 03/08/2023    CO2 27.6 03/08/2023    CALCIUM 10.2 03/08/2023    ALBUMIN 4.7 03/08/2023    AST 34 03/08/2023    ALT 63 (H) 03/08/2023     Lab Results   Component Value Date    CHOL 191 09/08/2022     Lab Results   Component Value Date    TRIG 107 09/08/2022     Lab Results   Component Value Date    HDL 47 09/08/2022     No components found for: LDLCALC  Lab Results   Component Value Date     (H) 09/08/2022     No results found for: HDLLDLRATIO  No components found for: CHOLHDL  No results found for: HGBA1C  Lab Results   Component Value Date    TSH 0.408 03/08/2023           ASSESSMENT AND PLAN:  #1 vasovagal syncope    #2  Sick sinus syndrome with multiple pauses on intracardiac loop    As described above patient with multiple pauses longest 6 seconds with associated symptom of near syncope.  He presented to discuss the further evaluations management.  Given the symptomatic multiple pauses on the intracardiac loop pacemaker was recommended.  Procedure risk pros and cons discussed with the patient and the family.  Understand willing to proceed forward.  Patient have previous normal stress test and echocardiogram at the [facility].    Risk included but not limited to infection, bleeding, stroke, pneumothorax, hematoma, cardiac perforation, lead dislodgment.  Risk range less than 1 to 7%.  Understand willing to proceed forward.  He is a Mallampati score 2 and ASA class II    Will explant intracardiac  loop at the same time of procedure risk regarding loop explantation also discussed with the patient.        I spent 25 minutes caring for Harry on this date of service. This time includes time spent by me of counseling/coordination of care as relates to the presenting problem and any ordered procedures/tests as outlined above.         This document has been electronically signed by Loren Daley MD on May 2, 2023 13:46 CDT          Diagnoses and all orders for this visit:    1. Sick sinus syndrome (Primary)    2. History of syncope          Loren Daley MD  5/2/2023  13:46 CDT

## 2023-05-03 ENCOUNTER — TELEPHONE (OUTPATIENT)
Dept: CARDIOLOGY | Facility: CLINIC | Age: 58
End: 2023-05-03
Payer: COMMERCIAL

## 2023-05-03 NOTE — TELEPHONE ENCOUNTER
Returned patient call per Dr. Daley that it was okay for him to golf.     He was understanding.   ----- Message from Virginia Stover sent at 5/3/2023  9:04 AM CDT -----  Regarding: call back  Contact: [phone]  Wants call back @ [phone], he is having pacemaker put in and said he plays golf and was told to tell you.

## 2023-05-04 ENCOUNTER — HOSPITAL ENCOUNTER (OUTPATIENT)
Facility: HOSPITAL | Age: 58
Discharge: HOME | End: 2023-05-05
Attending: INTERNAL MEDICINE | Admitting: INTERNAL MEDICINE
Payer: COMMERCIAL

## 2023-05-04 ENCOUNTER — APPOINTMENT (OUTPATIENT)
Dept: GENERAL RADIOLOGY | Facility: HOSPITAL | Age: 58
End: 2023-05-04
Payer: COMMERCIAL

## 2023-05-04 DIAGNOSIS — I49.5 SICK SINUS SYNDROME: ICD-10-CM

## 2023-05-04 LAB
ANION GAP SERPL CALCULATED.3IONS-SCNC: 10 MMOL/L (ref 5–15)
BUN SERPL-MCNC: 20 MG/DL (ref 6–20)
BUN/CREAT SERPL: 20.8 (ref 7–25)
CALCIUM SPEC-SCNC: 9.3 MG/DL (ref 8.6–10.5)
CHLORIDE SERPL-SCNC: 104 MMOL/L (ref 98–107)
CO2 SERPL-SCNC: 25 MMOL/L (ref 22–29)
CREAT SERPL-MCNC: 0.96 MG/DL (ref 0.76–1.27)
DEPRECATED RDW RBC AUTO: 42.2 FL (ref 37–54)
EGFRCR SERPLBLD CKD-EPI 2021: 93.3 ML/MIN/1.73
ERYTHROCYTE [DISTWIDTH] IN BLOOD BY AUTOMATED COUNT: 12.7 % (ref 12.3–15.4)
GLUCOSE BLDC GLUCOMTR-MCNC: 108 MG/DL (ref 70–130)
GLUCOSE BLDC GLUCOMTR-MCNC: 157 MG/DL (ref 70–130)
GLUCOSE SERPL-MCNC: 89 MG/DL (ref 65–99)
HCT VFR BLD AUTO: 49.4 % (ref 37.5–51)
HGB BLD-MCNC: 17.4 G/DL (ref 13–17.7)
INR PPP: 1.06 (ref 0.8–1.2)
MCH RBC QN AUTO: 31.8 PG (ref 26.6–33)
MCHC RBC AUTO-ENTMCNC: 35.2 G/DL (ref 31.5–35.7)
MCV RBC AUTO: 90.3 FL (ref 79–97)
PLATELET # BLD AUTO: 178 10*3/MM3 (ref 140–450)
PMV BLD AUTO: 10.5 FL (ref 6–12)
POTASSIUM SERPL-SCNC: 3.8 MMOL/L (ref 3.5–5.2)
PROTHROMBIN TIME: 13.7 SECONDS (ref 11.1–15.3)
RBC # BLD AUTO: 5.47 10*6/MM3 (ref 4.14–5.8)
SODIUM SERPL-SCNC: 139 MMOL/L (ref 136–145)
WBC NRBC COR # BLD: 5.72 10*3/MM3 (ref 3.4–10.8)

## 2023-05-04 PROCEDURE — 33208 INSRT HEART PM ATRIAL & VENT: CPT | Performed by: INTERNAL MEDICINE

## 2023-05-04 PROCEDURE — 25010000002 CEFAZOLIN PER 500 MG: Performed by: INTERNAL MEDICINE

## 2023-05-04 PROCEDURE — C1898 LEAD, PMKR, OTHER THAN TRANS: HCPCS | Performed by: INTERNAL MEDICINE

## 2023-05-04 PROCEDURE — 25010000002 MIDAZOLAM PER 1 MG: Performed by: INTERNAL MEDICINE

## 2023-05-04 PROCEDURE — C1894 INTRO/SHEATH, NON-LASER: HCPCS | Performed by: INTERNAL MEDICINE

## 2023-05-04 PROCEDURE — 82948 REAGENT STRIP/BLOOD GLUCOSE: CPT

## 2023-05-04 PROCEDURE — 33286 RMVL SUBQ CAR RHYTHM MNTR: CPT

## 2023-05-04 PROCEDURE — 71045 X-RAY EXAM CHEST 1 VIEW: CPT

## 2023-05-04 PROCEDURE — 25010000002 GENTAMICIN PER 80 MG: Performed by: INTERNAL MEDICINE

## 2023-05-04 PROCEDURE — 25010000002 FENTANYL CITRATE (PF) 50 MCG/ML SOLUTION: Performed by: INTERNAL MEDICINE

## 2023-05-04 PROCEDURE — 85027 COMPLETE CBC AUTOMATED: CPT | Performed by: INTERNAL MEDICINE

## 2023-05-04 PROCEDURE — C1785 PMKR, DUAL, RATE-RESP: HCPCS | Performed by: INTERNAL MEDICINE

## 2023-05-04 PROCEDURE — 80048 BASIC METABOLIC PNL TOTAL CA: CPT | Performed by: INTERNAL MEDICINE

## 2023-05-04 PROCEDURE — 85610 PROTHROMBIN TIME: CPT | Performed by: INTERNAL MEDICINE

## 2023-05-04 DEVICE — LD PM TENDRIL STS 6F46CM 2088TC46: Type: IMPLANTABLE DEVICE | Status: FUNCTIONAL

## 2023-05-04 DEVICE — LD PM TENDRIL STS 6F58CM 2088TC58: Type: IMPLANTABLE DEVICE | Site: HEART | Status: FUNCTIONAL

## 2023-05-04 DEVICE — GEN PM ASSURITY MRI DR RF PM2272: Type: IMPLANTABLE DEVICE | Site: CHEST | Status: FUNCTIONAL

## 2023-05-04 RX ORDER — PANTOPRAZOLE SODIUM 40 MG/1
40 TABLET, DELAYED RELEASE ORAL
Status: DISCONTINUED | OUTPATIENT
Start: 2023-05-05 | End: 2023-05-05 | Stop reason: HOSPADM

## 2023-05-04 RX ORDER — HYDROCODONE BITARTRATE AND ACETAMINOPHEN 5; 325 MG/1; MG/1
1 TABLET ORAL EVERY 4 HOURS PRN
Status: DISCONTINUED | OUTPATIENT
Start: 2023-05-04 | End: 2023-05-05 | Stop reason: HOSPADM

## 2023-05-04 RX ORDER — SODIUM CHLORIDE 0.9 % (FLUSH) 0.9 %
10 SYRINGE (ML) INJECTION AS NEEDED
Status: DISCONTINUED | OUTPATIENT
Start: 2023-05-04 | End: 2023-05-04 | Stop reason: HOSPADM

## 2023-05-04 RX ORDER — SODIUM CHLORIDE 0.9 % (FLUSH) 0.9 %
10 SYRINGE (ML) INJECTION AS NEEDED
Status: DISCONTINUED | OUTPATIENT
Start: 2023-05-04 | End: 2023-05-05 | Stop reason: HOSPADM

## 2023-05-04 RX ORDER — METFORMIN HYDROCHLORIDE 500 MG/1
500 TABLET, EXTENDED RELEASE ORAL
Status: DISCONTINUED | OUTPATIENT
Start: 2023-05-05 | End: 2023-05-05 | Stop reason: HOSPADM

## 2023-05-04 RX ORDER — SODIUM CHLORIDE 0.9 % (FLUSH) 0.9 %
10 SYRINGE (ML) INJECTION EVERY 12 HOURS SCHEDULED
Status: DISCONTINUED | OUTPATIENT
Start: 2023-05-04 | End: 2023-05-05 | Stop reason: HOSPADM

## 2023-05-04 RX ORDER — LIDOCAINE HYDROCHLORIDE 20 MG/ML
INJECTION, SOLUTION EPIDURAL; INFILTRATION; INTRACAUDAL; PERINEURAL
Status: DISCONTINUED | OUTPATIENT
Start: 2023-05-04 | End: 2023-05-04 | Stop reason: HOSPADM

## 2023-05-04 RX ORDER — LEVOTHYROXINE SODIUM 0.05 MG/1
50 TABLET ORAL
Status: DISCONTINUED | OUTPATIENT
Start: 2023-05-05 | End: 2023-05-05 | Stop reason: HOSPADM

## 2023-05-04 RX ORDER — MIDAZOLAM HYDROCHLORIDE 1 MG/ML
INJECTION INTRAMUSCULAR; INTRAVENOUS
Status: DISCONTINUED | OUTPATIENT
Start: 2023-05-04 | End: 2023-05-04 | Stop reason: HOSPADM

## 2023-05-04 RX ORDER — BUPIVACAINE HCL/0.9 % NACL/PF 0.1 %
2 PLASTIC BAG, INJECTION (ML) EPIDURAL ONCE
Status: DISCONTINUED | OUTPATIENT
Start: 2023-05-04 | End: 2023-05-04 | Stop reason: HOSPADM

## 2023-05-04 RX ORDER — SODIUM CHLORIDE 9 MG/ML
30 INJECTION, SOLUTION INTRAVENOUS CONTINUOUS
Status: DISCONTINUED | OUTPATIENT
Start: 2023-05-04 | End: 2023-05-05 | Stop reason: HOSPADM

## 2023-05-04 RX ORDER — SODIUM CHLORIDE 0.9 % (FLUSH) 0.9 %
3 SYRINGE (ML) INJECTION EVERY 12 HOURS SCHEDULED
Status: DISCONTINUED | OUTPATIENT
Start: 2023-05-04 | End: 2023-05-04 | Stop reason: HOSPADM

## 2023-05-04 RX ORDER — SODIUM CHLORIDE 9 MG/ML
40 INJECTION, SOLUTION INTRAVENOUS AS NEEDED
Status: DISCONTINUED | OUTPATIENT
Start: 2023-05-04 | End: 2023-05-05 | Stop reason: HOSPADM

## 2023-05-04 RX ORDER — FENTANYL CITRATE 50 UG/ML
INJECTION, SOLUTION INTRAMUSCULAR; INTRAVENOUS
Status: DISCONTINUED | OUTPATIENT
Start: 2023-05-04 | End: 2023-05-04 | Stop reason: HOSPADM

## 2023-05-04 RX ORDER — ONDANSETRON 2 MG/ML
4 INJECTION INTRAMUSCULAR; INTRAVENOUS EVERY 6 HOURS PRN
Status: DISCONTINUED | OUTPATIENT
Start: 2023-05-04 | End: 2023-05-05 | Stop reason: HOSPADM

## 2023-05-04 RX ORDER — SODIUM CHLORIDE 9 MG/ML
40 INJECTION, SOLUTION INTRAVENOUS AS NEEDED
Status: DISCONTINUED | OUTPATIENT
Start: 2023-05-04 | End: 2023-05-04 | Stop reason: HOSPADM

## 2023-05-04 RX ORDER — LIDOCAINE HYDROCHLORIDE AND EPINEPHRINE 20; 5 MG/ML; UG/ML
INJECTION, SOLUTION EPIDURAL; INFILTRATION; INTRACAUDAL; PERINEURAL
Status: DISCONTINUED | OUTPATIENT
Start: 2023-05-04 | End: 2023-05-04 | Stop reason: HOSPADM

## 2023-05-04 RX ADMIN — HYDROCODONE BITARTRATE AND ACETAMINOPHEN 1 TABLET: 5; 325 TABLET ORAL at 23:16

## 2023-05-04 RX ADMIN — SODIUM CHLORIDE 30 ML/HR: 9 INJECTION, SOLUTION INTRAVENOUS at 21:05

## 2023-05-04 RX ADMIN — SODIUM CHLORIDE 30 ML/HR: 9 INJECTION, SOLUTION INTRAVENOUS at 08:31

## 2023-05-04 NOTE — Clinical Note
A 4 fr sheath was  inserted using micropuncture technique into the left subclavian vein. Sheath insertion not delayed.

## 2023-05-05 VITALS
BODY MASS INDEX: 28.63 KG/M2 | TEMPERATURE: 98.3 F | OXYGEN SATURATION: 94 % | WEIGHT: 211.4 LBS | RESPIRATION RATE: 16 BRPM | DIASTOLIC BLOOD PRESSURE: 82 MMHG | HEART RATE: 78 BPM | HEIGHT: 72 IN | SYSTOLIC BLOOD PRESSURE: 138 MMHG

## 2023-05-05 LAB — GLUCOSE BLDC GLUCOMTR-MCNC: 112 MG/DL (ref 70–130)

## 2023-05-05 PROCEDURE — 82948 REAGENT STRIP/BLOOD GLUCOSE: CPT

## 2023-05-05 PROCEDURE — 99238 HOSP IP/OBS DSCHRG MGMT 30/<: CPT | Performed by: INTERNAL MEDICINE

## 2023-05-05 RX ADMIN — HYDROCODONE BITARTRATE AND ACETAMINOPHEN 1 TABLET: 5; 325 TABLET ORAL at 08:33

## 2023-05-05 RX ADMIN — LEVOTHYROXINE SODIUM 50 MCG: 50 TABLET ORAL at 06:33

## 2023-05-05 NOTE — PLAN OF CARE
Problem: Adult Inpatient Plan of Care  Goal: Plan of Care Review  Outcome: Ongoing, Progressing  Goal: Patient-Specific Goal (Individualized)  Outcome: Ongoing, Progressing  Goal: Absence of Hospital-Acquired Illness or Injury  Outcome: Ongoing, Progressing  Intervention: Identify and Manage Fall Risk  Intervention: Prevent Skin Injury  Goal: Optimal Comfort and Wellbeing  Outcome: Ongoing, Progressing  Intervention: Monitor Pain and Promote Comfort  Goal: Readiness for Transition of Care  Outcome: Ongoing, Progressing     Problem: Diabetes Comorbidity  Goal: Blood Glucose Level Within Targeted Range  Outcome: Ongoing, Progressing   Goal Outcome Evaluation: No new changes at this time

## 2023-05-05 NOTE — DISCHARGE SUMMARY
DISCHARGE SUMMARY      Date of Discharge:  5/5/2023    Discharge Diagnosis:   1. Sick sinus syndrome        Presenting Problem/History of Present Illness:  Sick sinus syndrome [I49.5]     Hospital Course:  Patient is a 57 y.o. male presented with symptomatic multiple pauses with history of syncope s/p intracardiac loop.  The patient underwent dual-chamber pacemaker yesterday and loop was explanted.  Chest x-ray no acute pathology was reported.  Pacemaker interrogated good sensing pacing threshold.  Monitor atrial pacing.  Post hospital management discussed with the patient.  Do not lift left arm above the shoulder and driving for 30 days.  Keep it dry for 1 week.  Wound checkup with the pacer clinic in 1 week and see me in 1 month.    Procedures Performed:  Procedure(s):  Pacemaker DC new  Device Explant       Consults:   Consults     No orders found for last 30 day(s).           Lab Results:  Lab Results (last 24 hours)     Procedure Component Value Units Date/Time    POC Glucose Once [[id number]]  (Normal) Collected: 05/05/23 0553    Specimen: Blood Updated: 05/05/23 0714     Glucose 112 mg/dL      Comment: RN NotifiedOperator: [id number] JEFF García ID: [id number]       POC Glucose Once [[id number]]  (Abnormal) Collected: 05/04/23 1610    Specimen: Blood Updated: 05/04/23 1637     Glucose 157 mg/dL      Comment: RN NotifiedOperator: [id number] NIK ALEXISMeter ID: [id number]       POC Glucose Once [[id number]]  (Normal) Collected: 05/04/23 1353    Specimen: Blood Updated: 05/04/23 1405     Glucose 108 mg/dL      Comment: RN NotifiedOperator: [id number] ZARAGOZA ALEXISMeter ID: [id number]             Review of Systems:   Constitutional:  No change in exercise tolerance.  HENT: Denies any hearing loss, epistaxis.  Eyes: Not blurred.  Respiratory:  Denies dyspnea with exertion, no cough or wheezing.  Cardiovascular: See H&P  Gastrointestinal:  Denies change in bowel habits, dyspepsia, ulcer  disease.  Endocrine: Negative for cold intolerance, heat intolerance, polydipsia, polyphagia.  Genitourinary: Negative.  Musculoskeletal: Denies any history of arthritic symptoms    Vital Signs:  Temp:  [97.3 °F (36.3 °C)-98.3 °F (36.8 °C)] 98.3 °F (36.8 °C)  Heart Rate:  [62-80] 78  Resp:  [16] 16  BP: (109-138)/(60-82) 138/82    Physical Exam:  Physical Exam:   Constitutional: Cooperative, alert and oriented, well-developed, well-nourished, in no acute distress.   Head: Normocephalic, conjunctivae are pink, thyroid is nonpalpable, no jugular venous distention.  Cardiovascular: Regular rate/rhythm, no S3, no pericardial rub.  Pulmonary/Chest: Chest positive bilateral good air entry  Abdominal: Abdomen soft, bowel sounds normoactive.  Musculoskeletal: No deformities, positive peripheral pulses  Neurological: No gross motor or sensory deficits noted, affect appropriate.       Discharge Disposition:  Home or Self Care    Discharge Medications:     Discharge Medications      Continue These Medications      Instructions Start Date   freestyle lancets   Use daily and as needed      glucose blood test strip   Use daily and as needed      glucose monitor monitoring kit   Use daily and as needed      levothyroxine 150 MCG tablet  Commonly known as: Synthroid   1 qd new dose      metFORMIN  MG 24 hr tablet  Commonly known as: GLUCOPHAGE-XR   500 mg, Oral, Daily With Breakfast, For sugar      omeprazole OTC 20 MG EC tablet  Commonly known as: PriLOSEC OTC   20 mg, Oral             Discharge Instructions: Do not lift weight or drive for 30 days    Discharge Diet: Cardiac    Discharge Condition: Stable    Activity at Discharge: As tolerated    Follow-up Appointments:  Future Appointments   Date Time Provider Department Center   5/15/2023  3:00 PM Andres Alejandra MD [facility]   9/8/2023 11:30 AM Andres Alejandra MD [facility]         Loren Daley MD  05/05/23  09:05 CDT

## 2023-05-05 NOTE — DISCHARGE INSTRUCTIONS
Keep it dry for 1 week    Wound checkup with the pacer clinic in 1 week    Do not lift left arm above the shoulder and driving for 30 days    Watch for sign of infection or hematoma

## 2023-05-12 ENCOUNTER — CLINICAL SUPPORT (OUTPATIENT)
Dept: CARDIOLOGY | Facility: CLINIC | Age: 58
End: 2023-05-12
Payer: COMMERCIAL

## 2023-05-12 DIAGNOSIS — Z95.0 PRESENCE OF CARDIAC PACEMAKER: Primary | ICD-10-CM

## 2023-05-12 NOTE — PROGRESS NOTES
Patient here for wound check. Left subclavian dressing removed, no drainage or redness at site. Incisional edges well approximated. Area cleaned with betadine, steri strips applied, and covered with large band aid. Patient to keep dressing dry and continue with left arm restrictions. Patient is also post op loop recorder removal. Left chest dressing removed and cleaned with betadine. Wound healed and left open to air. Patient to return next week for wound check, understanding voiced to all instructions.

## 2023-05-15 ENCOUNTER — OFFICE VISIT (OUTPATIENT)
Dept: FAMILY MEDICINE CLINIC | Facility: CLINIC | Age: 58
End: 2023-05-15
Payer: COMMERCIAL

## 2023-05-15 ENCOUNTER — LAB (OUTPATIENT)
Dept: LAB | Facility: HOSPITAL | Age: 58
End: 2023-05-15
Payer: COMMERCIAL

## 2023-05-15 VITALS
SYSTOLIC BLOOD PRESSURE: 118 MMHG | BODY MASS INDEX: 28.66 KG/M2 | HEIGHT: 72 IN | DIASTOLIC BLOOD PRESSURE: 80 MMHG | HEART RATE: 73 BPM | OXYGEN SATURATION: 95 % | WEIGHT: 211.6 LBS

## 2023-05-15 DIAGNOSIS — Z12.5 SCREENING PSA (PROSTATE SPECIFIC ANTIGEN): ICD-10-CM

## 2023-05-15 DIAGNOSIS — Z85.850 HISTORY OF THYROID CANCER: Chronic | ICD-10-CM

## 2023-05-15 DIAGNOSIS — E11.9 TYPE 2 DIABETES MELLITUS WITHOUT COMPLICATION, WITHOUT LONG-TERM CURRENT USE OF INSULIN: Primary | Chronic | ICD-10-CM

## 2023-05-15 DIAGNOSIS — E89.0 POSTABLATIVE HYPOTHYROIDISM: Chronic | ICD-10-CM

## 2023-05-15 DIAGNOSIS — Z23 NEED FOR VACCINATION: ICD-10-CM

## 2023-05-15 PROBLEM — Z95.0 PACEMAKER: Chronic | Status: ACTIVE | Noted: 2023-05-15

## 2023-05-15 PROBLEM — Z95.0 PACEMAKER: Status: ACTIVE | Noted: 2023-05-15

## 2023-05-15 PROBLEM — I49.5 SICK SINUS SYNDROME: Chronic | Status: ACTIVE | Noted: 2023-05-02

## 2023-05-15 LAB
EXPIRATION DATE: NORMAL
HBA1C MFR BLD: 6.5 %
Lab: 587

## 2023-05-15 PROCEDURE — 36415 COLL VENOUS BLD VENIPUNCTURE: CPT | Performed by: FAMILY MEDICINE

## 2023-05-15 PROCEDURE — 84443 ASSAY THYROID STIM HORMONE: CPT | Performed by: FAMILY MEDICINE

## 2023-05-15 PROCEDURE — 84439 ASSAY OF FREE THYROXINE: CPT | Performed by: FAMILY MEDICINE

## 2023-05-15 NOTE — PROGRESS NOTES
Subjective   Harry Zambrano is a 57 y.o. male.  Reevaluation new onset type 2 diabetes history of thyroid carcinoma with now hypothyroidism history of bradycardia status post pacemaker placement recently.  In the interim A1c is dropped to 6.5 on metformin alone is lost about 12 pounds watching diet try to be more active.  Still somewhat limited by his pacemaker placement recently.  Last T4 and TSH were not exactly where we want it we will need to repeat that today.  Also by virtue of new diagnoses due for Prevnar.  We will get shingles vaccine on a weekend where he can rest.    History of Present Illness   HPI    The following portions of the patient's history were reviewed and updated as appropriate: allergies, current medications, past family history, past medical history, past social history, past surgical history and problem list.    Review of Systems  Review of Systems   Constitutional: Negative for activity change, appetite change, fatigue and unexpected weight change.   HENT: Negative for trouble swallowing and voice change.    Eyes: Negative for redness and visual disturbance.   Respiratory: Negative for cough and wheezing.    Cardiovascular: Negative for chest pain and palpitations.   Gastrointestinal: Negative for abdominal pain, constipation, diarrhea, nausea and vomiting.   Genitourinary: Negative for urgency.   Musculoskeletal: Negative for joint swelling.   Neurological: Negative for syncope and headaches.   Hematological: Negative for adenopathy.   Psychiatric/Behavioral: Negative for sleep disturbance.       Objective   Physical Exam  Physical Exam  Constitutional:       Appearance: He is well-developed.   HENT:      Head: Normocephalic.   Eyes:      Pupils: Pupils are equal, round, and reactive to light.   Neck:      Thyroid: No thyroid mass or thyromegaly.   Cardiovascular:      Rate and Rhythm: Normal rate and regular rhythm.      Heart sounds: Normal heart sounds. No murmur heard.    No  "friction rub. No gallop.   Pulmonary:      Breath sounds: Normal breath sounds.   Abdominal:      General: There is no distension.      Palpations: Abdomen is soft. There is no mass.      Tenderness: There is no abdominal tenderness.   Musculoskeletal:         General: Normal range of motion.      Cervical back: Normal range of motion.      Comments: Get up and go 3 to 5 seconds gait normal   Skin:     General: Skin is warm and dry.   Neurological:      Mental Status: He is alert and oriented to person, place, and time.      Deep Tendon Reflexes: Reflexes are normal and symmetric.   Psychiatric:         Attention and Perception: Attention normal.         Mood and Affect: Mood normal.         Speech: Speech normal.         Behavior: Behavior normal.         Thought Content: Thought content normal.         Cognition and Memory: Cognition normal.       Results for orders placed or performed in visit on 05/15/23   POC Glycosylated Hemoglobin (Hb A1C)    Specimen: Blood   Result Value Ref Range    Hemoglobin A1C 6.5 %    Lot Number [id number]     Expiration Date 4/2,025    '        Visit Vitals  /80   Pulse 73   Ht 182.9 cm (72\")   Wt 96 kg (211 lb 9.6 oz)   SpO2 95%   BMI 28.70 kg/m²     Body mass index is 28.7 kg/m².    /80   Pulse 73   Ht 182.9 cm (72\")   Wt 96 kg (211 lb 9.6 oz)   SpO2 95%   BMI 28.70 kg/m²     Assessment/Plan   Diagnoses and all orders for this visit:    1. Type 2 diabetes mellitus without complication, without long-term current use of insulin (Primary)  -     POC Glycosylated Hemoglobin (Hb A1C)  -     Comprehensive Metabolic Panel; Future  -     Hemoglobin A1c; Future  -     Lipid Panel With LDL / HDL Ratio; Future  -     Magnesium; Future  -     MicroAlbumin, Urine, Random - Urine, Clean Catch; Future    2. History of thyroid cancer  -     TSH  -     T4, Free  -     TSH; Future  -     T4, Free; Future  -     Thyroglobulin; Future    3. Postablative hypothyroidism  -     TSH; Future  -   "   T4, Free; Future    4. Screening PSA (prostate specific antigen)  -     PSA Screen; Future    5. Need for vaccination  -     Pneumococcal Conjugate Vaccine 20-Valent All    Encouraged hydration exercise continued good weight control.  Continue medicines as outlined.  Repeat thyroid today.  Recheck 4 months all lab prior

## 2023-05-16 LAB
T4 FREE SERPL-MCNC: 1.67 NG/DL (ref 0.93–1.7)
TSH SERPL DL<=0.05 MIU/L-ACNC: 0.15 UIU/ML (ref 0.27–4.2)

## 2023-05-19 ENCOUNTER — CLINICAL SUPPORT (OUTPATIENT)
Dept: CARDIOLOGY | Facility: CLINIC | Age: 58
End: 2023-05-19
Payer: COMMERCIAL

## 2023-05-19 DIAGNOSIS — Z95.0 PRESENCE OF CARDIAC PACEMAKER: Primary | ICD-10-CM

## 2023-05-19 NOTE — PROGRESS NOTES
Patient here for wound check. Left subclavian dressing removed, incisional edges well approximated, no drainage or redness at site. Area cleaned with betadine and left open to air. Patient to call office with any drainage or redness at site or if he develops a temperature. Patient to continue with left arm restrictions and he voiced understanding to all instructions.

## 2023-06-09 ENCOUNTER — OFFICE VISIT (OUTPATIENT)
Dept: CARDIOLOGY | Facility: CLINIC | Age: 58
End: 2023-06-09
Payer: COMMERCIAL

## 2023-06-09 VITALS
DIASTOLIC BLOOD PRESSURE: 65 MMHG | OXYGEN SATURATION: 97 % | HEIGHT: 72 IN | SYSTOLIC BLOOD PRESSURE: 100 MMHG | WEIGHT: 209 LBS | HEART RATE: 70 BPM | BODY MASS INDEX: 28.31 KG/M2

## 2023-06-09 DIAGNOSIS — I49.5 SICK SINUS SYNDROME: Primary | ICD-10-CM

## 2023-06-09 NOTE — PROGRESS NOTES
Harry Zambrano  58 y.o. male    6/9/2023     1. Sick sinus syndrome    2      syncope    History of Present Illness:    Body mass index is 28.35 kg/m². BMI is above normal parameters. Recommendations include: exercise counseling, nutrition counseling and referral to primary care.    57 years old patient today who obtained intracardiac loop implanted for evaluation of syncope in the [facility].  Loop interrogated reported multiple pauses with symptom of dizziness and near syncope.  Patient underwent dual-chamber pacemaker.  He presented post hospital follow-up.  No symptom of cardiac decompensation is pleased diagnosed  mild diabetes.  Previous monitor revealed sinus rhythm with the PVCs.  History of thyroid cancer s/p surgery and he history of cirrhosis.  No orthopnea no PND no chest pain or lightheadedness dizziness or intermittent cardiac issue reported.  He was contacted earlier today he presented to discuss the management of symptomatic multiple pauses as described above with element of sick sinus syndrome.      /ELECTROPHYSIOLOGIST:     Dr. Daley     PROCEDURE(S) PERFORMED:    Implantation of a dual-chamber permanent pacemaker.        INDICATIONS FOR PROCDEDURE:            Symptomatic sick sinus syndrome with multiple pauses with history of syncope s/p intracardiac loop    SUBJECTIVE:    No Known Allergies      Past Medical History:   Diagnosis Date    Cancer 12/2015    thyroid    Diabetes mellitus     Disease of thyroid gland     GERD (gastroesophageal reflux disease)          Past Surgical History:   Procedure Laterality Date    CARDIAC ELECTROPHYSIOLOGY PROCEDURE N/A 5/4/2023    Procedure: Pacemaker DC new;  Surgeon: Loren Daley MD;  Location: [facility] CATH INVASIVE LOCATION;  Service: Cardiology;  Laterality: N/A;    CARDIAC ELECTROPHYSIOLOGY PROCEDURE Left 5/4/2023    Procedure: Device Explant;  Surgeon: Loren Daley MD;  Location: [facility] CATH INVASIVE LOCATION;  Service:  Cardiology;  Laterality: Left;  Loop Recorder    COLONOSCOPY      ENDOSCOPY      ROTATOR CUFF REPAIR      TOTAL THYROIDECTOMY           Family History   Problem Relation Age of Onset    Diabetes Mother     Hemochromatosis Maternal Grandfather          Social History     Socioeconomic History    Marital status:    Tobacco Use    Smoking status: Never    Smokeless tobacco: Never   Vaping Use    Vaping Use: Never used   Substance and Sexual Activity    Alcohol use: Never    Drug use: Never         Current Outpatient Medications   Medication Sig Dispense Refill    glucose blood test strip Use daily and as needed 100 each 3    glucose monitor monitoring kit Use daily and as needed 1 each 5    Lancets (freestyle) lancets Use daily and as needed 100 each 3    levothyroxine (Synthroid) 150 MCG tablet 1 qd new dose 90 tablet 3    metFORMIN ER (GLUCOPHAGE-XR) 500 MG 24 hr tablet Take 1 tablet by mouth Daily With Breakfast. For sugar 90 tablet 3    omeprazole OTC (PriLOSEC OTC) 20 MG EC tablet Take 1 tablet by mouth.       No current facility-administered medications for this visit.           Review of Systems:         Constitutional:  Denies recent weight loss, weight gain,no change in exercise tolerance.     HENT:  Denies any hearing loss, epistaxis    Eyes: No blurring    Respiratory: No history of COPD    Cardiovascular: See H&P    Gastrointestinal:  Denies change in bowel habits and dyspepsia    Endocrine: Negative for cold intolerance, heat intolerance, polydipsia    Genitourinary: Negative.      Musculoskeletal: History of osteoarthritis    Skin:  Deniesrashes, or skin lesions.     Allergic/Immunologic: Negative.  Negative for environmental allergies    Neurological: Positive for syncope    Hematological: Denies any food allergies, seasonal allergies    Psychiatric/Behavioral: Denies any history of depression        OBJECTIVE:    /65 (BP Location: Right arm, Patient Position: Sitting, Cuff Size: Adult)    "Pulse 70   Ht 182.9 cm (72\")   Wt 94.8 kg (209 lb)   SpO2 97%   BMI 28.35 kg/m²     Physical Exam:     No changes noted in today's physical exam    Constitutional: Cooperative, alert and oriented, well-developed, well-nourished, in no acute distress.     HENT:   Head: Normocephalic, conjunctive is a pink, thyroid is nonpalpable no carotid bruit and trachea central.     Cardiovascular: Regular rhythm, S1 and S2 normal, no S3 or S4. Apical impulse not displaced. No murmurs    Pulmonary/Chest: Chest: No chest wall tenderness no rales and wheezing    Abdominal: Abdomen soft, bowel sounds normoactive, no masses,    Musculoskeletal: No deformities, clubbing, cyanosis, erythema. positive mild edema  Neurological: No gross motor or sensory deficits noted    Skin: Warm and dry to the touch, no apparent skin lesions .     Psychiatric: He has a normal mood and affect. His behavior is normal        Procedures      Lab Results   Component Value Date    WBC 5.72 05/04/2023    HGB 17.4 05/04/2023    HCT 49.4 05/04/2023    MCV 90.3 05/04/2023     05/04/2023     Lab Results   Component Value Date    GLUCOSE 89 05/04/2023    BUN 20 05/04/2023    CREATININE 0.96 05/04/2023    EGFRIFNONA >60 06/12/2019    EGFRIFAFRI >60 06/12/2019    BCR 20.8 05/04/2023    CO2 25.0 05/04/2023    CALCIUM 9.3 05/04/2023    ALBUMIN 4.7 03/08/2023    AST 34 03/08/2023    ALT 63 (H) 03/08/2023     Lab Results   Component Value Date    CHOL 191 09/08/2022     Lab Results   Component Value Date    TRIG 107 09/08/2022     Lab Results   Component Value Date    HDL 47 09/08/2022     No components found for: LDLCALC  Lab Results   Component Value Date     (H) 09/08/2022     No results found for: HDLLDLRATIO  No components found for: CHOLHDL  Lab Results   Component Value Date    HGBA1C 6.5 05/15/2023     Lab Results   Component Value Date    TSH 0.148 (L) 05/15/2023           ASSESSMENT AND PLAN:  #1 vasovagal syncope no further recurrent  His " blood pressure bit on the lower side.  Patient encouraged to increase the water intake with a salt palatable to taste.    #2  Sick sinus syndrome with multiple pauses on intracardiac loop status post pacemaker  Presented post hospital follow-up.  With a significant improvement in quality of life no further dizziness or near syncope reported.  Site healed very well.  Patient will continue follow-up with the pacer clinic.  I will see him in 6 months.          Will explant intracardiac loop at the same time of procedure risk regarding loop explantation also discussed with the patient.        I spent 16minutes caring for Hrary on this date of service. This time includes time spent by me of counseling/coordination of care as relates to the presenting problem and any ordered procedures/tests as outlined above.         This document has been electronically signed by Loren Daley MD on June 9, 2023 10:37 CDT          Diagnoses and all orders for this visit:    1. Sick sinus syndrome (Primary)  -     Cancel: ECG 12 Lead          Loren Daley MD  6/9/2023  10:37 CDT

## 2023-08-23 ENCOUNTER — CLINICAL SUPPORT (OUTPATIENT)
Dept: CARDIOLOGY | Facility: CLINIC | Age: 58
End: 2023-08-23
Payer: COMMERCIAL

## 2023-08-23 DIAGNOSIS — I49.5 SICK SINUS SYNDROME: Primary | Chronic | ICD-10-CM

## 2023-08-23 NOTE — PROGRESS NOTES
Pacemaker Evaluation Report    August 23, 2023    Primary Cardiologist: Dr. Daley  Implanting MD: Dr. Daley  :[place] Model: [employer] MRI 2272 Serial Number: [id number]  Implant date: 5/4/23    Reason for evaluation: PPM, routine and office  Cardiac device indication(s): sick sinus syndrome    Battery  CL: 9.4-11.3 years     Interrogation Results  Atrial sensing: P wave: >5.0 mV  Atrial capture: 0.5 V @ 0.5 ms   Atrial lead impedance: 510 ohms  Ventricular sensing: R wave: 8.5 mV  Ventricular capture: 0.75 V @ 0.5 ms  Ventricular lead impedance: 560 ohms    Parameters  Mode: DDDR  Base Rate: 60/120    Diagnostic Data  Atrial paced: 35 %   Ventricular paced: <1 %  Mode switch: <1%  AT/AF [place]: 0%  AHR: 0  VHR: 0    Intrinsic Rate: 70    Changes made: A cap confirmed, turn on. PVARP increased to 325 ms for PMT.    Conclusions:  Normal pacemaker function. Follow up in one year, remote every 3 months.    Assessment:  Sick sinus syndrome    Normal working pacemaker              This document has been electronically signed by Loren Daley MD on August 25, 2023 16:16 CDT

## 2023-09-14 ENCOUNTER — LAB (OUTPATIENT)
Dept: LAB | Facility: HOSPITAL | Age: 58
End: 2023-09-14
Payer: COMMERCIAL

## 2023-09-14 DIAGNOSIS — Z85.850 HISTORY OF THYROID CANCER: ICD-10-CM

## 2023-09-14 DIAGNOSIS — E11.9 TYPE 2 DIABETES MELLITUS WITHOUT COMPLICATION, WITHOUT LONG-TERM CURRENT USE OF INSULIN: Chronic | ICD-10-CM

## 2023-09-14 DIAGNOSIS — E89.0 POSTABLATIVE HYPOTHYROIDISM: Chronic | ICD-10-CM

## 2023-09-14 DIAGNOSIS — Z12.5 SCREENING PSA (PROSTATE SPECIFIC ANTIGEN): ICD-10-CM

## 2023-09-14 LAB
ALBUMIN SERPL-MCNC: 4.5 G/DL (ref 3.5–5.2)
ALBUMIN/GLOB SERPL: 1.5 G/DL
ALP SERPL-CCNC: 60 U/L (ref 39–117)
ALT SERPL W P-5'-P-CCNC: 26 U/L (ref 1–41)
ANION GAP SERPL CALCULATED.3IONS-SCNC: 13.9 MMOL/L (ref 5–15)
AST SERPL-CCNC: 24 U/L (ref 1–40)
BILIRUB SERPL-MCNC: 0.5 MG/DL (ref 0–1.2)
BUN SERPL-MCNC: 17 MG/DL (ref 6–20)
BUN/CREAT SERPL: 16.8 (ref 7–25)
CALCIUM SPEC-SCNC: 9.7 MG/DL (ref 8.6–10.5)
CHLORIDE SERPL-SCNC: 102 MMOL/L (ref 98–107)
CHOLEST SERPL-MCNC: 174 MG/DL (ref 0–200)
CO2 SERPL-SCNC: 25.1 MMOL/L (ref 22–29)
CREAT SERPL-MCNC: 1.01 MG/DL (ref 0.76–1.27)
EGFRCR SERPLBLD CKD-EPI 2021: 87.3 ML/MIN/1.73
GLOBULIN UR ELPH-MCNC: 3 GM/DL
GLUCOSE SERPL-MCNC: 94 MG/DL (ref 65–99)
HBA1C MFR BLD: 6.2 % (ref 4.8–5.6)
HDLC SERPL-MCNC: 51 MG/DL (ref 40–60)
LDLC SERPL CALC-MCNC: 103 MG/DL (ref 0–100)
LDLC/HDLC SERPL: 1.98 {RATIO}
MAGNESIUM SERPL-MCNC: 2.3 MG/DL (ref 1.6–2.6)
POTASSIUM SERPL-SCNC: 4.2 MMOL/L (ref 3.5–5.2)
PROT SERPL-MCNC: 7.5 G/DL (ref 6–8.5)
PSA SERPL-MCNC: 0.49 NG/ML (ref 0–4)
SODIUM SERPL-SCNC: 141 MMOL/L (ref 136–145)
T4 FREE SERPL-MCNC: 1.85 NG/DL (ref 0.93–1.7)
TRIGL SERPL-MCNC: 110 MG/DL (ref 0–150)
TSH SERPL DL<=0.05 MIU/L-ACNC: 0.06 UIU/ML (ref 0.27–4.2)
VLDLC SERPL-MCNC: 20 MG/DL (ref 5–40)

## 2023-09-14 PROCEDURE — 80061 LIPID PANEL: CPT

## 2023-09-14 PROCEDURE — 86800 THYROGLOBULIN ANTIBODY: CPT

## 2023-09-14 PROCEDURE — 84443 ASSAY THYROID STIM HORMONE: CPT

## 2023-09-14 PROCEDURE — 84432 ASSAY OF THYROGLOBULIN: CPT

## 2023-09-14 PROCEDURE — 84439 ASSAY OF FREE THYROXINE: CPT

## 2023-09-14 PROCEDURE — G0103 PSA SCREENING: HCPCS

## 2023-09-14 PROCEDURE — 80053 COMPREHEN METABOLIC PANEL: CPT

## 2023-09-14 PROCEDURE — 83735 ASSAY OF MAGNESIUM: CPT

## 2023-09-14 PROCEDURE — 83036 HEMOGLOBIN GLYCOSYLATED A1C: CPT

## 2023-09-14 PROCEDURE — 82043 UR ALBUMIN QUANTITATIVE: CPT

## 2023-09-14 PROCEDURE — 36415 COLL VENOUS BLD VENIPUNCTURE: CPT

## 2023-09-15 LAB — ALBUMIN UR-MCNC: <1.2 MG/DL

## 2023-09-18 LAB
THYROGLOB AB SERPL-ACNC: <1 IU/ML (ref 0–0.9)
THYROGLOB SERPL-MCNC: <0.1 NG/ML (ref 1.4–29.2)

## 2023-09-19 ENCOUNTER — OFFICE VISIT (OUTPATIENT)
Dept: FAMILY MEDICINE CLINIC | Facility: CLINIC | Age: 58
End: 2023-09-19
Payer: COMMERCIAL

## 2023-09-19 VITALS
BODY MASS INDEX: 28.09 KG/M2 | HEART RATE: 72 BPM | SYSTOLIC BLOOD PRESSURE: 124 MMHG | DIASTOLIC BLOOD PRESSURE: 68 MMHG | WEIGHT: 207.4 LBS | HEIGHT: 72 IN | OXYGEN SATURATION: 98 %

## 2023-09-19 DIAGNOSIS — E89.0 POSTABLATIVE HYPOTHYROIDISM: Chronic | ICD-10-CM

## 2023-09-19 DIAGNOSIS — E89.0 POSTOPERATIVE HYPOTHYROIDISM: Chronic | ICD-10-CM

## 2023-09-19 DIAGNOSIS — E11.9 TYPE 2 DIABETES MELLITUS WITHOUT COMPLICATION, WITHOUT LONG-TERM CURRENT USE OF INSULIN: Primary | Chronic | ICD-10-CM

## 2023-09-19 DIAGNOSIS — Z85.850 HISTORY OF THYROID CANCER: Chronic | ICD-10-CM

## 2023-09-19 DIAGNOSIS — Z95.0 PACEMAKER: Chronic | ICD-10-CM

## 2023-09-19 PROCEDURE — 99214 OFFICE O/P EST MOD 30 MIN: CPT | Performed by: FAMILY MEDICINE

## 2023-09-19 RX ORDER — LEVOTHYROXINE SODIUM 0.15 MG/1
TABLET ORAL
Qty: 90 TABLET | Refills: 3 | Status: SHIPPED | OUTPATIENT
Start: 2023-09-19

## 2023-09-19 NOTE — PROGRESS NOTES
Subjective   Harry Zambrano is a 58 y.o. male.  Reevaluation new onset type 2 diabetes history of thyroid cancer with thyroid with ablation and treatment afterwards.  Diagnoses below in the interim lab work is acceptable feels well no complaints now working in [employer].  Is maintaining weight and blood pressures had some mild SI joint pain on the right which can be treated conservatively.  Overall stable and improving.    History of Present Illness   HPI    The following portions of the patient's history were reviewed and updated as appropriate: allergies, current medications, past family history, past medical history, past social history, past surgical history, and problem list.    Review of Systems  Review of Systems   Constitutional:  Negative for activity change, appetite change, fatigue and unexpected weight change.   HENT:  Negative for trouble swallowing and voice change.    Eyes:  Negative for redness and visual disturbance.   Respiratory:  Negative for cough and wheezing.    Cardiovascular:  Negative for chest pain and palpitations.   Gastrointestinal:  Negative for abdominal pain, constipation, diarrhea, nausea and vomiting.   Genitourinary:  Negative for urgency.   Musculoskeletal:  Positive for back pain. Negative for joint swelling.   Neurological:  Negative for syncope and headaches.   Hematological:  Negative for adenopathy.   Psychiatric/Behavioral:  Negative for sleep disturbance.      Objective   Physical Exam  Physical Exam  Constitutional:       Appearance: He is well-developed.   HENT:      Head: Normocephalic.   Eyes:      Pupils: Pupils are equal, round, and reactive to light.   Neck:      Thyroid: No thyromegaly.      Comments: No mass  Cardiovascular:      Rate and Rhythm: Normal rate and regular rhythm.      Heart sounds: Normal heart sounds. No murmur heard.    No friction rub. No gallop.   Pulmonary:      Breath sounds: Normal breath sounds.   Abdominal:      General: There is no  distension.      Palpations: Abdomen is soft. There is no mass.      Tenderness: There is no abdominal tenderness.   Musculoskeletal:         General: Normal range of motion.      Cervical back: Normal range of motion.      Comments: No edema 2+ pulses get up and go 3 to 5 seconds negative straight leg raise normal range of motion back   Skin:     General: Skin is warm and dry.   Neurological:      Mental Status: He is alert and oriented to person, place, and time.      Deep Tendon Reflexes: Reflexes are normal and symmetric.   Psychiatric:         Attention and Perception: Attention normal.         Mood and Affect: Mood normal.         Speech: Speech normal.         Behavior: Behavior normal.         Thought Content: Thought content normal.     Results for orders placed or performed in visit on 09/14/23   Thyroglobulin Antibody and Thyroglobulin, [facility] or LC/MS-MS    Specimen: Blood   Result Value Ref Range    Thyroglobulin Ab <1.0 0.0 - 0.9 IU/mL   Comprehensive Metabolic Panel    Specimen: Blood   Result Value Ref Range    Glucose 94 65 - 99 mg/dL    BUN 17 6 - 20 mg/dL    Creatinine 1.01 0.76 - 1.27 mg/dL    Sodium 141 136 - 145 mmol/L    Potassium 4.2 3.5 - 5.2 mmol/L    Chloride 102 98 - 107 mmol/L    CO2 25.1 22.0 - 29.0 mmol/L    Calcium 9.7 8.6 - 10.5 mg/dL    Total Protein 7.5 6.0 - 8.5 g/dL    Albumin 4.5 3.5 - 5.2 g/dL    ALT (SGPT) 26 1 - 41 U/L    AST (SGOT) 24 1 - 40 U/L    Alkaline Phosphatase 60 39 - 117 U/L    Total Bilirubin 0.5 0.0 - 1.2 mg/dL    Globulin 3.0 gm/dL    A/G Ratio 1.5 g/dL    BUN/Creatinine Ratio 16.8 7.0 - 25.0    Anion Gap 13.9 5.0 - 15.0 mmol/L    eGFR 87.3 >60.0 mL/min/1.73   Hemoglobin A1c    Specimen: Blood   Result Value Ref Range    Hemoglobin A1C 6.20 (H) 4.80 - 5.60 %   Lipid Panel With LDL / HDL Ratio    Specimen: Blood   Result Value Ref Range    Total Cholesterol 174 0 - 200 mg/dL    Triglycerides 110 0 - 150 mg/dL    HDL Cholesterol 51 40 - 60 mg/dL    LDL Cholesterol   "103 (H) 0 - 100 mg/dL    VLDL Cholesterol 20 5 - 40 mg/dL    LDL/HDL Ratio 1.98    Magnesium    Specimen: Blood   Result Value Ref Range    Magnesium 2.3 1.6 - 2.6 mg/dL   PSA Screen    Specimen: Blood   Result Value Ref Range    PSA 0.490 0.000 - 4.000 ng/mL   TSH    Specimen: Blood   Result Value Ref Range    TSH 0.061 (L) 0.270 - 4.200 uIU/mL   T4, Free    Specimen: Blood   Result Value Ref Range    Free T4 1.85 (H) 0.93 - 1.70 ng/dL   MicroAlbumin, Urine, Random - Urine, Clean Catch    Specimen: Urine, Clean Catch   Result Value Ref Range    Microalbumin, Urine <1.2 mg/dL   Thyroglobulin By [facility]    Specimen: Blood   Result Value Ref Range    THYROGLOBULIN BY [facility] <0.1 (L) 1.4 - 29.2 ng/mL         Visit Vitals  /68   Pulse 72   Ht 182.9 cm (72\")   Wt 94.1 kg (207 lb 6.4 oz)   SpO2 98%   BMI 28.13 kg/m²     Body mass index is 28.13 kg/m².    /68   Pulse 72   Ht 182.9 cm (72\")   Wt 94.1 kg (207 lb 6.4 oz)   SpO2 98%   BMI 28.13 kg/m²     Assessment/Plan   Diagnoses and all orders for this visit:    1. Type 2 diabetes mellitus without complication, without long-term current use of insulin (Primary)  -     Hemoglobin A1c; Future    2. History of thyroid cancer  -     T4, Free; Future  -     TSH; Future  -     Thyroglobulin; Future    3. Postablative hypothyroidism  -     T4, Free; Future  -     TSH; Future    4. Pacemaker    5. Postoperative hypothyroidism  -     levothyroxine (Synthroid) 150 MCG tablet; 1 qd new dose  Dispense: 90 tablet; Refill: 3    Counseled on back exercises yoga jenifer chi etc. counseled on lifestyle measures counseled on COVID flu vaccines in the near future.  Lifestyle measures discussed shingles vaccine when can rest arm.  Recheck 6 months lab prior  "

## 2023-09-21 LAB — THYROGLOB SERPL-MCNC: <2 NG/ML

## (undated) DEVICE — EXTENDEVAC ELECTROSURGICAL PENCIL: Brand: DEROYAL

## (undated) DEVICE — KT INTRO MINISTICK MAX W/GW NITNL/TUNG ECHO 4F 21G 7CM

## (undated) DEVICE — 9135-LP 3M UNIV ELECTROSURGICA L PLATE, STD, CORDED, 40/CASE: Brand: 3M™

## (undated) DEVICE — PK PM 60

## (undated) DEVICE — ELECTRODE,RT,STRESS,FOAM,50PK: Brand: MEDLINE